# Patient Record
Sex: MALE | Race: WHITE | Employment: FULL TIME | URBAN - NONMETROPOLITAN AREA
[De-identification: names, ages, dates, MRNs, and addresses within clinical notes are randomized per-mention and may not be internally consistent; named-entity substitution may affect disease eponyms.]

---
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
## 2019-04-02 ENCOUNTER — HOSPITAL ENCOUNTER (EMERGENCY)
Age: 19
Discharge: HOME | End: 2019-04-02
Payer: COMMERCIAL

## 2019-04-02 VITALS
WEIGHT: 125 LBS | RESPIRATION RATE: 16 BRPM | HEIGHT: 66 IN | HEART RATE: 98 BPM | DIASTOLIC BLOOD PRESSURE: 76 MMHG | SYSTOLIC BLOOD PRESSURE: 102 MMHG | TEMPERATURE: 98.2 F | BODY MASS INDEX: 20.09 KG/M2 | OXYGEN SATURATION: 98 %

## 2019-04-02 DIAGNOSIS — J02.0 STREP PHARYNGITIS: Primary | ICD-10-CM

## 2019-04-02 LAB
FLU A ANTIGEN: NEGATIVE
FLU B ANTIGEN: NEGATIVE
GROUP A STREP CULTURE, REFLEX: POSITIVE
HETEROPHILE ANTIBODIES: NEGATIVE
REFLEX THROAT C + S: NORMAL

## 2019-04-02 PROCEDURE — 36415 COLL VENOUS BLD VENIPUNCTURE: CPT

## 2019-04-02 PROCEDURE — 87880 STREP A ASSAY W/OPTIC: CPT

## 2019-04-02 PROCEDURE — 87804 INFLUENZA ASSAY W/OPTIC: CPT

## 2019-04-02 PROCEDURE — 86308 HETEROPHILE ANTIBODY SCREEN: CPT

## 2019-04-02 PROCEDURE — 6370000000 HC RX 637 (ALT 250 FOR IP): Performed by: STUDENT IN AN ORGANIZED HEALTH CARE EDUCATION/TRAINING PROGRAM

## 2019-04-02 PROCEDURE — 6360000002 HC RX W HCPCS: Performed by: STUDENT IN AN ORGANIZED HEALTH CARE EDUCATION/TRAINING PROGRAM

## 2019-04-02 PROCEDURE — 99282 EMERGENCY DEPT VISIT SF MDM: CPT

## 2019-04-02 PROCEDURE — 96372 THER/PROPH/DIAG INJ SC/IM: CPT

## 2019-04-02 RX ORDER — IBUPROFEN 800 MG/1
800 TABLET ORAL ONCE
Status: COMPLETED | OUTPATIENT
Start: 2019-04-02 | End: 2019-04-02

## 2019-04-02 RX ADMIN — PENICILLIN G BENZATHINE 0.6 MILLION UNITS: 1200000 INJECTION, SUSPENSION INTRAMUSCULAR at 12:08

## 2019-04-02 RX ADMIN — IBUPROFEN 800 MG: 800 TABLET, FILM COATED ORAL at 12:02

## 2019-04-02 ASSESSMENT — ENCOUNTER SYMPTOMS
SHORTNESS OF BREATH: 0
WHEEZING: 0
RHINORRHEA: 0
SORE THROAT: 1
VOMITING: 0
CHEST TIGHTNESS: 0
CHOKING: 0
ABDOMINAL PAIN: 0
NAUSEA: 0
COUGH: 1
TROUBLE SWALLOWING: 0
SINUS PRESSURE: 0

## 2019-04-02 ASSESSMENT — PAIN DESCRIPTION - PAIN TYPE: TYPE: ACUTE PAIN

## 2019-04-02 ASSESSMENT — PAIN SCALES - GENERAL: PAINLEVEL_OUTOF10: 5

## 2019-04-02 ASSESSMENT — PAIN DESCRIPTION - LOCATION: LOCATION: THROAT

## 2019-04-02 ASSESSMENT — PAIN DESCRIPTION - DESCRIPTORS: DESCRIPTORS: SORE

## 2019-04-02 NOTE — ED NOTES
Pt presents to the ED through triage with complaints of pharyngitis and cough. Pt states that these symptoms started about 2 or 3 days ago. States throat pain 5/10, and \"sore. \"  Pt states that he has been coughing and \"hacking up mucus. \"  Flu and strep swabs obtained and sent to lab at this time.      Tadeo Feliciano, LINDSAY  04/02/19 4707

## 2019-04-02 NOTE — LETTER
[facility] [address] [address] EMERGENCY DEPT  [address]  [address] [place] [address] [address] [address]  Phone: [phone]               April 2, 2019    Patient: Aylin Hollis   YOB: 2000   Date of Visit: 4/2/2019       To Whom It May Concern:    Xiomara Tinajero was seen and treated in our emergency department on 4/2/2019. He may return to work on 4/3/2019.       Sincerely,       Tai handley RN         Signature:__________________________________

## 2019-04-02 NOTE — ED PROVIDER NOTES
MONONUCLEOSIS SCREEN       EMERGENCYDEPARTMENTCOURSE:   Vitals:    Vitals:    04/02/19 1115   BP: 102/76   Pulse: 98   Resp: 16   Temp: 98.2 °F (36.8 °C)   TempSrc: Oral   SpO2: 98%   Weight: 125 lb (56.7 kg)   Height: 5' 6\" (1.676 m)     Patient was seen history physical exam was performed. See disposition below    MDM:  Patient's strep test is negative. Flu and mono negative. Treated in ED with bicillin and motrin with symptom improvement. Anticipatory guidance given and patient is comfortable with plan of care. They will follow up with PCP for follow up or further evaluation if symptoms continue. They will return to the ED with worsening of symptoms and we discussed reasons for returning. CRITICAL CARE:   None    CONSULTS:  None    PROCEDURES:  None    FINAL IMPRESSION      1. Strep pharyngitis          DISPOSITION/PLAN   discharge    PATIENT REFERREDTO:  [address] [place] [address]  Go to   If symptoms worsen      DISCHARGE MEDICATIONS:  There are no discharge medications for this patient. (Please note that portions of this note were completed with a voice recognition program.  Efforts were made to edit the dictations but occasionally words are mis-transcribed.)    The patient was given an opportunity to see the Emergency Attending. The patient voiced understanding that I was a Mid-Level Provider and was in agreement with being seen independently by myself. Provider:  I personally performed the services described in the documentation, reviewed and edited the documentation which was dictated to the scribe in my presence, and it accurately records my words and actions.     Nalini Nunn PA-C 4/2/19 12:51 PM    ANGEL Urbina PA-C  04/02/19 [id number]

## 2019-08-02 ENCOUNTER — HOSPITAL ENCOUNTER (EMERGENCY)
Age: 19
Discharge: HOME | End: 2019-08-02
Attending: EMERGENCY MEDICINE
Payer: MEDICAID

## 2019-08-02 ENCOUNTER — APPOINTMENT (OUTPATIENT)
Dept: GENERAL RADIOLOGY | Age: 19
End: 2019-08-02
Payer: MEDICAID

## 2019-08-02 VITALS
DIASTOLIC BLOOD PRESSURE: 74 MMHG | SYSTOLIC BLOOD PRESSURE: 122 MMHG | RESPIRATION RATE: 18 BRPM | HEIGHT: 67 IN | TEMPERATURE: 98.4 F | HEART RATE: 88 BPM | OXYGEN SATURATION: 99 % | WEIGHT: 125 LBS | BODY MASS INDEX: 19.62 KG/M2

## 2019-08-02 DIAGNOSIS — J06.9 ACUTE UPPER RESPIRATORY INFECTION: Primary | ICD-10-CM

## 2019-08-02 LAB
FLU A ANTIGEN: NEGATIVE
FLU B ANTIGEN: NEGATIVE
GROUP A STREP CULTURE, REFLEX: NEGATIVE
REFLEX THROAT C + S: NORMAL

## 2019-08-02 PROCEDURE — 87880 STREP A ASSAY W/OPTIC: CPT

## 2019-08-02 PROCEDURE — 87804 INFLUENZA ASSAY W/OPTIC: CPT

## 2019-08-02 PROCEDURE — 99283 EMERGENCY DEPT VISIT LOW MDM: CPT

## 2019-08-02 PROCEDURE — 71046 X-RAY EXAM CHEST 2 VIEWS: CPT

## 2019-08-02 PROCEDURE — 6370000000 HC RX 637 (ALT 250 FOR IP): Performed by: EMERGENCY MEDICINE

## 2019-08-02 PROCEDURE — 87070 CULTURE OTHR SPECIMN AEROBIC: CPT

## 2019-08-02 RX ORDER — BENZONATATE 100 MG/1
100 CAPSULE ORAL 3 TIMES DAILY PRN
Qty: 20 CAPSULE | Refills: 0 | Status: SHIPPED | OUTPATIENT
Start: 2019-08-02 | End: 2019-08-09

## 2019-08-02 RX ORDER — IBUPROFEN 200 MG
400 TABLET ORAL ONCE
Status: COMPLETED | OUTPATIENT
Start: 2019-08-02 | End: 2019-08-02

## 2019-08-02 RX ORDER — ONDANSETRON 4 MG/1
4 TABLET, ORALLY DISINTEGRATING ORAL ONCE
Status: COMPLETED | OUTPATIENT
Start: 2019-08-02 | End: 2019-08-02

## 2019-08-02 RX ORDER — NAPROXEN 250 MG/1
250 TABLET ORAL 2 TIMES DAILY WITH MEALS
Qty: 30 TABLET | Refills: 0 | Status: ON HOLD | OUTPATIENT
Start: 2019-08-02 | End: 2022-05-13 | Stop reason: HOSPADM

## 2019-08-02 RX ORDER — BENZONATATE 100 MG/1
100 CAPSULE ORAL 3 TIMES DAILY PRN
Status: DISCONTINUED | OUTPATIENT
Start: 2019-08-02 | End: 2019-08-02 | Stop reason: HOSPADM

## 2019-08-02 RX ORDER — ALBUTEROL SULFATE 90 UG/1
2 AEROSOL, METERED RESPIRATORY (INHALATION) EVERY 4 HOURS PRN
Qty: 1 INHALER | Refills: 0 | Status: SHIPPED | OUTPATIENT
Start: 2019-08-02 | End: 2019-08-09

## 2019-08-02 RX ADMIN — ONDANSETRON 4 MG: 4 TABLET, ORALLY DISINTEGRATING ORAL at 10:43

## 2019-08-02 RX ADMIN — IBUPROFEN 400 MG: 200 TABLET, FILM COATED ORAL at 10:43

## 2019-08-02 RX ADMIN — BENZONATATE 100 MG: 100 CAPSULE ORAL at 10:42

## 2019-08-02 ASSESSMENT — ENCOUNTER SYMPTOMS
WHEEZING: 0
NAUSEA: 0
COUGH: 1
EYE DISCHARGE: 0
VOMITING: 1
TROUBLE SWALLOWING: 0
CONSTIPATION: 0
RHINORRHEA: 1
SORE THROAT: 1
DIARRHEA: 0
EYE REDNESS: 0
ABDOMINAL PAIN: 0
SINUS PRESSURE: 0
CHEST TIGHTNESS: 0
SHORTNESS OF BREATH: 0

## 2019-08-02 ASSESSMENT — PAIN DESCRIPTION - DESCRIPTORS: DESCRIPTORS: BURNING

## 2019-08-02 ASSESSMENT — PAIN DESCRIPTION - LOCATION: LOCATION: THROAT

## 2019-08-02 ASSESSMENT — PAIN SCALES - GENERAL
PAINLEVEL_OUTOF10: 5
PAINLEVEL_OUTOF10: 5

## 2019-08-02 ASSESSMENT — PAIN DESCRIPTION - PAIN TYPE: TYPE: ACUTE PAIN

## 2019-08-02 NOTE — ED PROVIDER NOTES
has a past medical history of ADHD (attention deficit hyperactivity disorder). SURGICAL HISTORY      has no past surgical history on file. CURRENT MEDICATIONS       Discharge Medication List as of 8/2/2019 12:21 PM          ALLERGIES     has No Known Allergies. FAMILY HISTORY     He indicated that his mother is alive. He indicated that his father is alive. family history is not on file. SOCIAL HISTORY      reports that he has been smoking. He has been smoking about 0.50 packs per day for the past 0.00 years. He has never used smokeless tobacco. He reports that he drinks alcohol. He reports that he has current or past drug history. Drug: Marijuana. PHYSICAL EXAM     INITIAL VITALS:  height is 5' 7\" (1.702 m) and weight is 125 lb (56.7 kg). His oral temperature is 98.4 °F (36.9 °C). His blood pressure is 122/74 and his pulse is 88. His respiration is 18 and oxygen saturation is 99%. CONSTITUTIONAL: [Awake, alert, non toxic, well developed, well nourished, no acute distress]  HEAD: [Normocephalic, atraumatic]  EYES: [Pupils equal, round & reactive to light, extraocular movements intact, no nystagmus, clear conjunctiva, non-icteric sclera]  ENT: [External ear canal clear without evidence of cerumen impaction or foreign body, TM's clear without erythema or bulging. Nares patent without drainage, septum appears midline. Moist mucus membranes, oropharynx is diffusely erythematous without exudate or mass. Uvula midline]  NECK: [Nontender and supple. No meningismus, no appreciated lymphadenopathy. Intact full range of motion. C-spine midline without vertebral tenderness. Trachea midline.]  CARDIOVASCULAR: [Regular rate, rhythm, normal S1 and S2. No appreciated murmurs, rubs, or gallops. No pulse deficits appreciated. Intact distal perfusion. JVD not appreciated.]  PULMONARY: [Respiratory distress absent. Respiratory effort normal. Breath sounds clear to auscultation without rhonchi, rales, or wheezing. taking these medications    Details   benzonatate (TESSALON) 100 MG capsule Take 1 capsule by mouth 3 times daily as needed for Cough, Disp-20 capsule, R-0Print      albuterol sulfate  (90 Base) MCG/ACT inhaler Inhale 2 puffs into the lungs every 4 hours as needed for Wheezing, Disp-1 Inhaler, R-0Print      naproxen (NAPROSYN) 250 MG tablet Take 1 tablet by mouth 2 times daily (with meals), Disp-30 tablet, R-0Print             (Please note that portions ofthis note were completed with a voice recognition program.  Efforts were made to edit the dictations but occasionally words are mis-transcribed.)    Scribe:  Lilly Bradley 8/2/19 10:17 AM Scribing for and in the presence of Terris Cooks, MD.    Signed by: Liat Kidd, 08/02/19 12:47 PM    Provider:  I personally performed the services described in the documentation, reviewed and edited the documentation which was dictated to the scribe in my presence, and it accurately records my words and actions.     Terris Cooks, MD 8/2/19 12:47 PM                  Terris Cooks, MD  08/02/19 1376

## 2019-08-02 NOTE — ED NOTES
Patient to ED for cough. Patient state cough started two days ago. Patient now coughing so hard it makes him vomit. Patient denies fever diarrhea.      Herminia Stoll RN  08/02/19 1007

## 2019-08-04 LAB — THROAT/NOSE CULTURE: NORMAL

## 2021-06-15 ENCOUNTER — HOSPITAL ENCOUNTER (EMERGENCY)
Age: 21
Discharge: HOME | End: 2021-06-15
Payer: COMMERCIAL

## 2021-06-15 ENCOUNTER — APPOINTMENT (OUTPATIENT)
Dept: CT IMAGING | Age: 21
End: 2021-06-15
Payer: COMMERCIAL

## 2021-06-15 VITALS
HEART RATE: 71 BPM | SYSTOLIC BLOOD PRESSURE: 155 MMHG | RESPIRATION RATE: 15 BRPM | DIASTOLIC BLOOD PRESSURE: 77 MMHG | TEMPERATURE: 98.4 F | WEIGHT: 135 LBS | OXYGEN SATURATION: 99 % | HEIGHT: 67 IN | BODY MASS INDEX: 21.19 KG/M2

## 2021-06-15 DIAGNOSIS — F07.81 POST CONCUSSION SYNDROME: Primary | ICD-10-CM

## 2021-06-15 PROCEDURE — 99282 EMERGENCY DEPT VISIT SF MDM: CPT

## 2021-06-15 PROCEDURE — 6370000000 HC RX 637 (ALT 250 FOR IP): Performed by: PHYSICIAN ASSISTANT

## 2021-06-15 PROCEDURE — 70450 CT HEAD/BRAIN W/O DYE: CPT

## 2021-06-15 PROCEDURE — 72125 CT NECK SPINE W/O DYE: CPT

## 2021-06-15 RX ORDER — NAPROXEN 250 MG/1
500 TABLET ORAL ONCE
Status: COMPLETED | OUTPATIENT
Start: 2021-06-15 | End: 2021-06-15

## 2021-06-15 RX ADMIN — NAPROXEN 500 MG: 250 TABLET ORAL at 13:48

## 2021-06-15 ASSESSMENT — PAIN DESCRIPTION - LOCATION: LOCATION: HEAD

## 2021-06-15 ASSESSMENT — PAIN DESCRIPTION - PAIN TYPE: TYPE: ACUTE PAIN

## 2021-06-15 ASSESSMENT — PAIN SCALES - GENERAL: PAINLEVEL_OUTOF10: 8
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
## 2021-10-06 ENCOUNTER — HOSPITAL ENCOUNTER (EMERGENCY)
Age: 21
Discharge: HOME | End: 2021-10-06
Attending: EMERGENCY MEDICINE
Payer: COMMERCIAL

## 2021-10-06 VITALS
HEIGHT: 67 IN | BODY MASS INDEX: 21.03 KG/M2 | DIASTOLIC BLOOD PRESSURE: 79 MMHG | OXYGEN SATURATION: 97 % | RESPIRATION RATE: 18 BRPM | HEART RATE: 63 BPM | TEMPERATURE: 98.4 F | WEIGHT: 134 LBS | SYSTOLIC BLOOD PRESSURE: 111 MMHG

## 2021-10-06 DIAGNOSIS — K29.00 ACUTE GASTRITIS WITHOUT HEMORRHAGE, UNSPECIFIED GASTRITIS TYPE: ICD-10-CM

## 2021-10-06 DIAGNOSIS — R10.13 ABDOMINAL PAIN, EPIGASTRIC: Primary | ICD-10-CM

## 2021-10-06 LAB
ALBUMIN SERPL-MCNC: 4.9 G/DL (ref 3.5–5.1)
ALP BLD-CCNC: 56 U/L (ref 38–126)
ALT SERPL-CCNC: 13 U/L (ref 11–66)
ANION GAP SERPL CALCULATED.3IONS-SCNC: 11 MEQ/L (ref 8–16)
AST SERPL-CCNC: 23 U/L (ref 5–40)
BASOPHILS # BLD: 0.7 %
BASOPHILS ABSOLUTE: 0 THOU/MM3 (ref 0–0.1)
BILIRUB SERPL-MCNC: 0.2 MG/DL (ref 0.3–1.2)
BUN BLDV-MCNC: 11 MG/DL (ref 7–22)
CALCIUM SERPL-MCNC: 9.6 MG/DL (ref 8.5–10.5)
CHLORIDE BLD-SCNC: 103 MEQ/L (ref 98–111)
CO2: 26 MEQ/L (ref 23–33)
CREAT SERPL-MCNC: 0.6 MG/DL (ref 0.4–1.2)
EOSINOPHIL # BLD: 2.7 %
EOSINOPHILS ABSOLUTE: 0.2 THOU/MM3 (ref 0–0.4)
ERYTHROCYTE [DISTWIDTH] IN BLOOD BY AUTOMATED COUNT: 13.2 % (ref 11.5–14.5)
ERYTHROCYTE [DISTWIDTH] IN BLOOD BY AUTOMATED COUNT: 45.5 FL (ref 35–45)
GFR SERPL CREATININE-BSD FRML MDRD: > 90 ML/MIN/1.73M2
GLUCOSE BLD-MCNC: 98 MG/DL (ref 70–108)
HCT VFR BLD CALC: 40.2 % (ref 42–52)
HEMOGLOBIN: 13.8 GM/DL (ref 14–18)
IMMATURE GRANS (ABS): 0.01 THOU/MM3 (ref 0–0.07)
IMMATURE GRANULOCYTES: 0.1 %
LIPASE: 27.8 U/L (ref 5.6–51.3)
LYMPHOCYTES # BLD: 36.3 %
LYMPHOCYTES ABSOLUTE: 2.5 THOU/MM3 (ref 1–4.8)
MCH RBC QN AUTO: 32.3 PG (ref 26–33)
MCHC RBC AUTO-ENTMCNC: 34.3 GM/DL (ref 32.2–35.5)
MCV RBC AUTO: 94.1 FL (ref 80–94)
MONOCYTES # BLD: 8.3 %
MONOCYTES ABSOLUTE: 0.6 THOU/MM3 (ref 0.4–1.3)
NUCLEATED RED BLOOD CELLS: 0 /100 WBC
OSMOLALITY CALCULATION: 278.8 MOSMOL/KG (ref 275–300)
PLATELET # BLD: 229 THOU/MM3 (ref 130–400)
PMV BLD AUTO: 9.9 FL (ref 9.4–12.4)
POTASSIUM REFLEX MAGNESIUM: 3.9 MEQ/L (ref 3.5–5.2)
RBC # BLD: 4.27 MILL/MM3 (ref 4.7–6.1)
SEG NEUTROPHILS: 51.9 %
SEGMENTED NEUTROPHILS ABSOLUTE COUNT: 3.6 THOU/MM3 (ref 1.8–7.7)
SODIUM BLD-SCNC: 140 MEQ/L (ref 135–145)
TOTAL PROTEIN: 6.9 G/DL (ref 6.1–8)
WBC # BLD: 7 THOU/MM3 (ref 4.8–10.8)

## 2021-10-06 PROCEDURE — 85025 COMPLETE CBC W/AUTO DIFF WBC: CPT

## 2021-10-06 PROCEDURE — 80053 COMPREHEN METABOLIC PANEL: CPT

## 2021-10-06 PROCEDURE — 99284 EMERGENCY DEPT VISIT MOD MDM: CPT

## 2021-10-06 PROCEDURE — 36415 COLL VENOUS BLD VENIPUNCTURE: CPT

## 2021-10-06 PROCEDURE — 83690 ASSAY OF LIPASE: CPT

## 2021-10-06 PROCEDURE — 6370000000 HC RX 637 (ALT 250 FOR IP): Performed by: EMERGENCY MEDICINE

## 2021-10-06 RX ORDER — SUCRALFATE 1 G/1
1 TABLET ORAL 4 TIMES DAILY
Qty: 40 TABLET | Refills: 0 | Status: ON HOLD | OUTPATIENT
Start: 2021-10-06 | End: 2022-05-13 | Stop reason: HOSPADM

## 2021-10-06 RX ORDER — PANTOPRAZOLE SODIUM 40 MG/1
40 TABLET, DELAYED RELEASE ORAL ONCE
Status: COMPLETED | OUTPATIENT
Start: 2021-10-06 | End: 2021-10-06

## 2021-10-06 RX ORDER — PANTOPRAZOLE SODIUM 40 MG/1
40 TABLET, DELAYED RELEASE ORAL DAILY
Qty: 90 TABLET | Refills: 0 | Status: ON HOLD | OUTPATIENT
Start: 2021-10-06 | End: 2022-05-13 | Stop reason: HOSPADM

## 2021-10-06 RX ORDER — MAGNESIUM HYDROXIDE/ALUMINUM HYDROXICE/SIMETHICONE 120; 1200; 1200 MG/30ML; MG/30ML; MG/30ML
30 SUSPENSION ORAL ONCE
Status: COMPLETED | OUTPATIENT
Start: 2021-10-06 | End: 2021-10-06

## 2021-10-06 RX ADMIN — ALUMINUM HYDROXIDE, MAGNESIUM HYDROXIDE, AND SIMETHICONE 30 ML: 200; 200; 20 SUSPENSION ORAL at 14:01

## 2021-10-06 RX ADMIN — PANTOPRAZOLE SODIUM 40 MG: 40 TABLET, DELAYED RELEASE ORAL at 14:01

## 2021-10-06 ASSESSMENT — ENCOUNTER SYMPTOMS
SHORTNESS OF BREATH: 0
RHINORRHEA: 0
TROUBLE SWALLOWING: 0
WHEEZING: 0
COUGH: 0
VOMITING: 0
SINUS PRESSURE: 0
SORE THROAT: 0
NAUSEA: 1
DIARRHEA: 0
VOICE CHANGE: 0
ABDOMINAL PAIN: 1
CONSTIPATION: 0
BACK PAIN: 0
CHEST TIGHTNESS: 0

## 2021-10-06 ASSESSMENT — PAIN DESCRIPTION - ORIENTATION: ORIENTATION: MID;UPPER

## 2021-10-06 ASSESSMENT — PAIN SCALES - GENERAL: PAINLEVEL_OUTOF10: 6

## 2021-10-06 ASSESSMENT — PAIN DESCRIPTION - PAIN TYPE: TYPE: ACUTE PAIN

## 2021-10-06 ASSESSMENT — PAIN DESCRIPTION - LOCATION: LOCATION: ABDOMEN

## 2021-10-06 NOTE — ED PROVIDER NOTES
[facility]        Room # 32/12A    CHIEF COMPLAINT    Chief Complaint   Patient presents with    Abdominal Pain       Nurses Notes reviewed and I agree except as noted in the HPI. HPI    Linda Powell is a 24 y.o. male who presents for evaluation of pain in the epigastric area upon waking up 7:30 this morning. The patient's pain is nonradiating is worse when the patient went to work. The pain characterized as sharp burning pain. Patient did not have any cough, no cold, no shortness of breath, no chest pain, no nausea, no vomiting. Denies any alcohol intake yesterday or today however they ate pizza  and spicy chicken last night. Did not have any melena or hematochezia. REVIEW OF SYSTEMS    Review of Systems   Constitutional: Negative for appetite change, chills, diaphoresis, fatigue and fever. HENT: Negative for congestion, ear discharge, ear pain, postnasal drip, rhinorrhea, sinus pressure, sore throat, trouble swallowing and voice change. Respiratory: Negative for cough, chest tightness, shortness of breath and wheezing. Cardiovascular: Negative for chest pain, palpitations and leg swelling. Gastrointestinal: Positive for abdominal pain and nausea. Negative for constipation, diarrhea and vomiting. Musculoskeletal: Negative for arthralgias, back pain, joint swelling, myalgias, neck pain and neck stiffness. Skin: Negative for rash. Neurological: Negative for dizziness, syncope, weakness, light-headedness, numbness and headaches. PAST MEDICAL HISTORY     has a past medical history of ADHD (attention deficit hyperactivity disorder). SURGICAL HISTORY   has no past surgical history on file.     CURRENT MEDICATIONS    Previous Medications    ALBUTEROL SULFATE  (90 BASE) MCG/ACT INHALER    Inhale 2 puffs into the lungs every 4 hours as needed for Wheezing    NAPROXEN (NAPROSYN) 250 MG TABLET motion and neck supple. Lymphadenopathy:      Cervical: No cervical adenopathy. Skin:     Findings: No rash. Neurological:      Mental Status: He is alert and oriented to person, place, and time. Psychiatric:         Behavior: Behavior is cooperative. MEDICAL DECISION MAKING    DIFFERENTIAL DIAGNOSIS:  Arthritis, GERD, peptic ulcer disease pancreatitis      DIAGNOSTIC RESULTS      RADIOLOGY:    No orders to display       LABS:   Labs Reviewed   CBC WITH AUTO DIFFERENTIAL - Abnormal; Notable for the following components:       Result Value    RBC 4.27 (*)     Hemoglobin 13.8 (*)     Hematocrit 40.2 (*)     MCV 94.1 (*)     RDW-SD 45.5 (*)     All other components within normal limits   COMPREHENSIVE METABOLIC PANEL W/ REFLEX TO MG FOR LOW K - Abnormal; Notable for the following components: Total Bilirubin 0.2 (*)     All other components within normal limits   LIPASE   ANION GAP   GLOMERULAR FILTRATION RATE, ESTIMATED   OSMOLALITY     All other unresulted laboratory test above are normal:    Vitals:    Vitals:    10/06/21 1241 10/06/21 1428   BP: 135/84 111/79   Pulse: 60 63   Resp: 16 18   Temp: 98.4 °F (36.9 °C)    TempSrc: Oral    SpO2: 99% 97%   Weight: 134 lb (60.8 kg)    Height: 5' 7\" (1.702 m)        EMERGENCY DEPARTMENT COURSE:    Medications   aluminum & magnesium hydroxide-simethicone (MAALOX) 200-200-20 MG/5ML suspension 30 mL (30 mLs Oral Given 10/6/21 1401)   pantoprazole (PROTONIX) tablet 40 mg (40 mg Oral Given 10/6/21 1401)       The pt was seen and evaluated by me. Within the department, I observed the pt's vitalsigns to be within acceptable range. Laboratory studies were performed, results were reviewed with the patient. Within the department, the pt was treated with Maalox and Protonix. Patient when reevaluated pain had improved and feeling well. I observed the pt's condition to be hemodynamically stable during the duration of their stay.  I explained my proposed course of treatment to the pt, and they were amenable to my decision. They were discharged home, and they will return to the ED if their symptoms become more severein nature, or otherwise change. Controlled Substances Monitoring:        CRITICAL CARE:   None. CONSULTS:  None    PROCEDURES:  None. FINAL IMPRESSION       1. Abdominal pain, epigastric/    2. Acute gastritis without hemorrhage, unspecified gastritis type          DISPOSITION/PLAN  PATIENT REFERRED TO:  [facility] [address],[address] [facility] [address] [place] [address]  Schedule an appointment as soon as possible for a visit in 1 week      DISCHARGE MEDICATIONS:  New Prescriptions    PANTOPRAZOLE (PROTONIX) 40 MG TABLET    Take 1 tablet by mouth daily    SUCRALFATE (CARAFATE) 1 GM TABLET    Take 1 tablet by mouth 4 times daily for 40 doses         (Please note that portions of this note were completed with a voice recognition program and electronically transcribed. Efforts were [facility] edit the dictations but occasionally words are mis-transcribed . The transcription may contain errors not detected in proofreading.   This transcription was electronically signed.)     10/06/21 3:03 PM      Jose Guadalupe Langley MD      Emergency room physician           Jose Guadalupe Langley MD  10/09/21 2684

## 2022-01-09 ENCOUNTER — HOSPITAL ENCOUNTER (EMERGENCY)
Age: 22
Discharge: HOME | End: 2022-01-09
Attending: EMERGENCY MEDICINE
Payer: COMMERCIAL

## 2022-01-09 ENCOUNTER — APPOINTMENT (OUTPATIENT)
Dept: GENERAL RADIOLOGY | Age: 22
End: 2022-01-09
Payer: COMMERCIAL

## 2022-01-09 VITALS
HEIGHT: 67 IN | DIASTOLIC BLOOD PRESSURE: 78 MMHG | TEMPERATURE: 98.1 F | RESPIRATION RATE: 18 BRPM | HEART RATE: 98 BPM | BODY MASS INDEX: 21.19 KG/M2 | SYSTOLIC BLOOD PRESSURE: 122 MMHG | WEIGHT: 135 LBS | OXYGEN SATURATION: 98 %

## 2022-01-09 DIAGNOSIS — F41.1 ANXIETY STATE: ICD-10-CM

## 2022-01-09 DIAGNOSIS — R07.9 CHEST PAIN, UNSPECIFIED TYPE: Primary | ICD-10-CM

## 2022-01-09 LAB
ANION GAP SERPL CALCULATED.3IONS-SCNC: 13 MEQ/L (ref 8–16)
BASOPHILS # BLD: 0.5 %
BASOPHILS ABSOLUTE: 0.1 THOU/MM3 (ref 0–0.1)
BUN BLDV-MCNC: 13 MG/DL (ref 7–22)
CALCIUM SERPL-MCNC: 10.3 MG/DL (ref 8.5–10.5)
CHLORIDE BLD-SCNC: 100 MEQ/L (ref 98–111)
CO2: 24 MEQ/L (ref 23–33)
CREAT SERPL-MCNC: 0.8 MG/DL (ref 0.4–1.2)
EOSINOPHIL # BLD: 0.7 %
EOSINOPHILS ABSOLUTE: 0.1 THOU/MM3 (ref 0–0.4)
ERYTHROCYTE [DISTWIDTH] IN BLOOD BY AUTOMATED COUNT: 13.5 % (ref 11.5–14.5)
ERYTHROCYTE [DISTWIDTH] IN BLOOD BY AUTOMATED COUNT: 45.5 FL (ref 35–45)
GFR SERPL CREATININE-BSD FRML MDRD: > 90 ML/MIN/1.73M2
GLUCOSE BLD-MCNC: 91 MG/DL (ref 70–108)
HCT VFR BLD CALC: 43.9 % (ref 42–52)
HEMOGLOBIN: 15.3 GM/DL (ref 14–18)
IMMATURE GRANS (ABS): 0.03 THOU/MM3 (ref 0–0.07)
IMMATURE GRANULOCYTES: 0.2 %
LYMPHOCYTES # BLD: 15.6 %
LYMPHOCYTES ABSOLUTE: 2.1 THOU/MM3 (ref 1–4.8)
MAGNESIUM: 2 MG/DL (ref 1.6–2.4)
MCH RBC QN AUTO: 31.8 PG (ref 26–33)
MCHC RBC AUTO-ENTMCNC: 34.9 GM/DL (ref 32.2–35.5)
MCV RBC AUTO: 91.3 FL (ref 80–94)
MONOCYTES # BLD: 7.5 %
MONOCYTES ABSOLUTE: 1 THOU/MM3 (ref 0.4–1.3)
NUCLEATED RED BLOOD CELLS: 0 /100 WBC
OSMOLALITY CALCULATION: 273.5 MOSMOL/KG (ref 275–300)
PLATELET # BLD: 290 THOU/MM3 (ref 130–400)
PMV BLD AUTO: 9.2 FL (ref 9.4–12.4)
POTASSIUM REFLEX MAGNESIUM: 3.4 MEQ/L (ref 3.5–5.2)
RBC # BLD: 4.81 MILL/MM3 (ref 4.7–6.1)
SEG NEUTROPHILS: 75.5 %
SEGMENTED NEUTROPHILS ABSOLUTE COUNT: 10 THOU/MM3 (ref 1.8–7.7)
SODIUM BLD-SCNC: 137 MEQ/L (ref 135–145)
TROPONIN T: < 0.01 NG/ML
WBC # BLD: 13.3 THOU/MM3 (ref 4.8–10.8)

## 2022-01-09 PROCEDURE — 36415 COLL VENOUS BLD VENIPUNCTURE: CPT

## 2022-01-09 PROCEDURE — 6360000002 HC RX W HCPCS: Performed by: EMERGENCY MEDICINE

## 2022-01-09 PROCEDURE — 71045 X-RAY EXAM CHEST 1 VIEW: CPT

## 2022-01-09 PROCEDURE — 85025 COMPLETE CBC W/AUTO DIFF WBC: CPT

## 2022-01-09 PROCEDURE — 80048 BASIC METABOLIC PNL TOTAL CA: CPT

## 2022-01-09 PROCEDURE — 93005 ELECTROCARDIOGRAM TRACING: CPT | Performed by: EMERGENCY MEDICINE

## 2022-01-09 PROCEDURE — 84484 ASSAY OF TROPONIN QUANT: CPT

## 2022-01-09 PROCEDURE — 99284 EMERGENCY DEPT VISIT MOD MDM: CPT

## 2022-01-09 PROCEDURE — 96376 TX/PRO/DX INJ SAME DRUG ADON: CPT

## 2022-01-09 PROCEDURE — 96374 THER/PROPH/DIAG INJ IV PUSH: CPT

## 2022-01-09 PROCEDURE — 83735 ASSAY OF MAGNESIUM: CPT

## 2022-01-09 RX ORDER — LORAZEPAM 2 MG/ML
1 INJECTION INTRAMUSCULAR ONCE
Status: COMPLETED | OUTPATIENT
Start: 2022-01-09 | End: 2022-01-09

## 2022-01-09 RX ADMIN — LORAZEPAM 1 MG: 2 INJECTION INTRAMUSCULAR; INTRAVENOUS at 10:26

## 2022-01-09 RX ADMIN — LORAZEPAM 1 MG: 2 INJECTION INTRAMUSCULAR; INTRAVENOUS at 11:51

## 2022-01-09 ASSESSMENT — ENCOUNTER SYMPTOMS
ABDOMINAL PAIN: 0
BLOOD IN STOOL: 0
SHORTNESS OF BREATH: 1
CHEST TIGHTNESS: 1
VOMITING: 0
COUGH: 0
BACK PAIN: 0
NAUSEA: 0
DIARRHEA: 0
TROUBLE SWALLOWING: 0
VOICE CHANGE: 0

## 2022-01-09 ASSESSMENT — PAIN DESCRIPTION - DESCRIPTORS: DESCRIPTORS: TIGHTNESS

## 2022-01-09 ASSESSMENT — PAIN DESCRIPTION - LOCATION: LOCATION: CHEST

## 2022-01-09 ASSESSMENT — PAIN DESCRIPTION - FREQUENCY: FREQUENCY: CONTINUOUS

## 2022-01-09 ASSESSMENT — PAIN DESCRIPTION - PAIN TYPE: TYPE: ACUTE PAIN

## 2022-01-09 ASSESSMENT — PAIN SCALES - GENERAL: PAINLEVEL_OUTOF10: 6

## 2022-01-09 NOTE — ED PROVIDER NOTES
[facility] [address] [address] EMERGENCY DEPT    EMERGENCY MEDICINE     Pt Name: Mamta Verdugo  MRN: [id number]  [place] 2000  Date of evaluation: 1/9/2022  Provider: Sd Petersen DO, [place] COMPLAINT       Chief Complaint   Patient presents with    Anxiety       HISTORY OF PRESENT ILLNESS    Mamta Verdugo is a pleasant 24 y.o. male   Presents to the emergency department from home   Chest tightness and SOB; symptoms started this mornign when he awoke  No diaphoresis  +carpopedal paresthesias and spasms  Snorted cocaine around 24hrs ago but denies any other drugs  No syncope, no diaphoresis      Triage notes and Nursing notes were reviewed by myself. Any discrepancies are addressed above. PAST MEDICAL HISTORY     Past Medical History:   Diagnosis Date    ADHD (attention deficit hyperactivity disorder)        SURGICAL HISTORY     History reviewed. No pertinent surgical history. CURRENT MEDICATIONS       Discharge Medication List as of 1/9/2022  1:08 PM      CONTINUE these medications which have NOT CHANGED    Details   pantoprazole (PROTONIX) 40 MG tablet Take 1 tablet by mouth daily, Disp-90 tablet, R-0Normal      sucralfate (CARAFATE) 1 GM tablet Take 1 tablet by mouth 4 times daily for 40 doses, Disp-40 tablet, R-0Normal      albuterol sulfate  (90 Base) MCG/ACT inhaler Inhale 2 puffs into the lungs every 4 hours as needed for Wheezing, Disp-1 Inhaler, R-0Print      naproxen (NAPROSYN) 250 MG tablet Take 1 tablet by mouth 2 times daily (with meals), Disp-30 tablet, R-0Print             ALLERGIES     Patient has no known allergies. FAMILY HISTORY     History reviewed. No pertinent family history.      SOCIAL HISTORY       Social History     Socioeconomic History    Marital status: Single     Spouse name: None    Number of children: None    Years of education: None    Highest education level: None   Occupational History    None   Tobacco Use    Smoking status: Current Every Day Smoker Packs/day: 0.50     Years: 0.00     Pack years: 0.00    Smokeless tobacco: Never Used   Vaping Use    Vaping Use: Never used   Substance and Sexual Activity    Alcohol use: Yes     Comment: rare    Drug use: Yes     Types: Marijuana Montrell Don)    Sexual activity: None   Other Topics Concern    None   Social History Narrative    None     Social Determinants of Health     Financial Resource Strain:     Difficulty of Paying Living Expenses: Not on file   Food Insecurity:     Worried About Running Out of Food in the Last Year: Not on file    Shae of Food in the Last Year: Not on file   Transportation Needs:     Lack of Transportation (Medical): Not on file    Lack of Transportation (Non-Medical): Not on file   Physical Activity:     Days of Exercise per Week: Not on file    Minutes of Exercise per Session: Not on file   Stress:     Feeling of Stress : Not on file   Social Connections:     Frequency of Communication with Friends and Family: Not on file    Frequency of Social Gatherings with Friends and Family: Not on file    Attends Scientology Services: Not on file    Active Member of Clubs or Organizations: Not on file    Attends Club or Organization Meetings: Not on file    Marital Status: Not on file   Intimate Partner Violence:     Fear of Current or Ex-Partner: Not on file    Emotionally Abused: Not on file    Physically Abused: Not on file    Sexually Abused: Not on file   Housing Stability:     Unable to Pay for Housing in the Last Year: Not on file    Number of [place] in the Last Year: Not on file    Unstable Housing in the Last Year: Not on file       REVIEW OF SYSTEMS     Review of Systems   Constitutional: Negative for chills, diaphoresis and fever. HENT: Negative for trouble swallowing and voice change. Eyes: Negative for visual disturbance. Respiratory: Positive for chest tightness and shortness of breath. Negative for cough.     Cardiovascular: Negative for chest pain and leg swelling. Gastrointestinal: Negative for abdominal pain, blood in stool, diarrhea, nausea and vomiting. Genitourinary: Negative for dysuria, frequency and hematuria. Musculoskeletal: Negative for back pain and neck pain. Skin: Negative for rash and wound. Neurological: Negative for speech difficulty, weakness, numbness and headaches. Psychiatric/Behavioral: Negative for confusion. Anxiety       Except as noted above the remainder of the review of systems was reviewed and is. PHYSICAL EXAM    (up to 7 for level 4, 8 or more for level 5)     ED Triage Vitals   BP Temp Temp src Pulse Resp SpO2 Height Weight   -- -- -- -- -- -- -- --       Physical Exam  Vitals and nursing note reviewed. Constitutional:       General: He is not in acute distress. Appearance: He is well-developed. He is not ill-appearing, toxic-appearing or diaphoretic. Comments: Appears anxious   HENT:      Head: Normocephalic and atraumatic. Eyes:      General: No scleral icterus. Conjunctiva/sclera: Conjunctivae normal.      Right eye: Right conjunctiva is not injected. Left eye: Left conjunctiva is not injected. Pupils: Pupils are equal, round, and reactive to light. Neck:      Thyroid: No thyromegaly. Trachea: No tracheal deviation. Cardiovascular:      Rate and Rhythm: Normal rate and regular rhythm. Heart sounds: Normal heart sounds. No murmur heard. No friction rub. No gallop. Pulmonary:      Effort: Pulmonary effort is normal. No respiratory distress. Breath sounds: Normal breath sounds. No stridor. No wheezing or rales. Abdominal:      General: Bowel sounds are normal. There is no distension. Palpations: Abdomen is soft. There is no mass. Tenderness: There is no abdominal tenderness. There is no guarding or rebound.       Comments: Negative Nassar's sign  Nontender McBurney's Point  Negative Rovsig's sign  No bruising or echymosis of abdomen Musculoskeletal:         General: No tenderness. Cervical back: Normal range of motion and neck supple. Comments: Negative Cyndy's Sign bilaterally   Lymphadenopathy:      Cervical: No cervical adenopathy. Skin:     General: Skin is warm and dry. Coloration: Skin is not pale. Findings: No erythema or rash. Neurological:      Mental Status: He is alert and oriented to person, place, and time. Cranial Nerves: No cranial nerve deficit. Motor: No abnormal muscle tone. Coordination: Coordination normal.      Comments: No nystagmus   Psychiatric:         Behavior: Behavior normal.         Thought Content: Thought content normal.         DIAGNOSTIC RESULTS     EKG:(none if blank)  All EKG's are interpreted by the[facility] Phys[facility] who either signs or Co-signs this chart in the absence of a cardiologist.        RADIOLOGY: (none if blank)   Interpretation per the Radiologistbelow, if available at the time of this note:    XR CHEST PORTABLE   Final Result   Stable radiographic appearance of the chest. No evidence of an acute process. **This report has been created using voice recognition software. It may contain minor errors which are inherent in voice recognition technology. **      Final report electronically signed by Dr. Ryan Rubio MD on 1/9/2022 11:17 AM          LABS:  Labs Reviewed   CBC WITH AUTO DIFFERENTIAL - Abnormal; Notable for the following components:       Result Value    WBC 13.3 (*)     RDW-SD 45.5 (*)     MPV 9.2 (*)     Segs Absolute 10.0 (*)     All other components within normal limits   BASIC METABOLIC PANEL W/ REFLEX TO MG FOR LOW K - Abnormal; Notable for the following components:    Potassium reflex Magnesium 3.4 (*)     All other components within normal limits   OSMOLALITY - Abnormal; Notable for the following components:    Osmolality Calc 273.5 (*)     All other components within normal limits   TROPONIN   ANION GAP GLOMERULAR FILTRATION RATE, ESTIMATED   MAGNESIUM       All other labs were within normal range or not returned as of this dictation. Please note, any cultures that may have been sent were not resulted at the time of this patient visit. EMERGENCY DEPARTMENT COURSE andMedical Decision Making:     MDM/  ED Course as of 01/19/22 1529   Sun Jan 09, 2022   1144 Reassessed, feels significantly better, however still continues to feel somewhat anxious. Carpopedal spasms have improved/resolved. Denies any recent illnesses  Has been under quite a bit of stress lately, including multiple bad family situations. The patient denies any homicidal or suicidal ideations. Does feel somewhat depressed but has no specific suicidality. Leukocytosis noted, attribute it well to patient's significant stress as well as stimulant use recently.   [AB]      ED Course User Index  [AB] Joseline Medeiros DO       Strict returnprecautions and follow up instructions were discussed with the patient with which the patient agrees      ED Medications administered this visit:    Medications   LORazepam (ATIVAN) injection 1 mg (1 mg IntraVENous Given 1/9/22 1026)   LORazepam (ATIVAN) injection 1 mg (1 mg IntraVENous Given 1/9/22 1151)         Procedures: (None if blank)      The care of the patient took place at a time of a significant regional and national Covid-19 surge, resulting in severe overcrowding and limitation of healthcare resources, including nursing staffing and inpatient beds. CLINICAL IMPRESSION     1. Chest pain, unspecified type    2. Anxiety state          DISPOSITION/PLAN   DISPOSITION Decision To Discharge 01/09/2022 12:16:20 PM      PATIENT REFERRED TO:  Dez Moore, [address] [place] [address] Dr CASAS [facility] [address]  [phone]    In 2 days      [address] [place] [address].  [address] [place] [address] [phone]  In 2 days        DISCHARGE MEDICATIONS:  Discharge Medication List as of 1/9/2022  1:08 PM (Please note that portions of this note were completed with a voice recognition program.  Efforts were made to edit the dictations but occasionallywords are mis-transcribed.)      Quita Scruggs DO, FACEP (electronically signed)  Attending Physician, Emergency [address], DO  01/19/22 1529

## 2022-01-09 NOTE — ED NOTES
Patient to ED via EMS from work for chest pain and anxiety. Patient states he was having a little chest pain and thought of his step dad who had a heart attack. This induced a anxiety attach. Patient complaining of numbness and tingling in bilateral upper extremities. Patient states hands are cramping.  patient alert and oriented on arrival. Patient admits to recent marijuana and cocaine use     Peter Go RN  01/09/22 4822

## 2022-01-09 NOTE — ED NOTES
Bed: 007A  Expected date:   Expected time:   Means of arrival:   Comments:  Tanesha Godinez, LINDSAY  01/09/22 1014

## 2022-01-10 LAB
EKG ATRIAL RATE: 92 BPM
EKG P AXIS: 77 DEGREES
EKG P-R INTERVAL: 106 MS
EKG Q-T INTERVAL: 330 MS
EKG QRS DURATION: 80 MS
EKG QTC CALCULATION (BAZETT): 408 MS
EKG R AXIS: 84 DEGREES
EKG T AXIS: 78 DEGREES
EKG VENTRICULAR RATE: 92 BPM

## 2022-01-10 PROCEDURE — 93010 ELECTROCARDIOGRAM REPORT: CPT | Performed by: INTERNAL MEDICINE

## 2022-02-01 ENCOUNTER — HOSPITAL ENCOUNTER (EMERGENCY)
Age: 22
Discharge: HOME | End: 2022-02-01
Payer: COMMERCIAL

## 2022-02-01 VITALS
OXYGEN SATURATION: 97 % | SYSTOLIC BLOOD PRESSURE: 111 MMHG | HEART RATE: 74 BPM | DIASTOLIC BLOOD PRESSURE: 76 MMHG | TEMPERATURE: 98.3 F | RESPIRATION RATE: 16 BRPM

## 2022-02-01 DIAGNOSIS — Z20.822 ENCOUNTER FOR LABORATORY TESTING FOR COVID-19 VIRUS: ICD-10-CM

## 2022-02-01 DIAGNOSIS — J02.9 VIRAL PHARYNGITIS: Primary | ICD-10-CM

## 2022-02-01 DIAGNOSIS — J06.9 VIRAL URI WITH COUGH: ICD-10-CM

## 2022-02-01 LAB
FLU A ANTIGEN: NEGATIVE
FLU B ANTIGEN: NEGATIVE
GROUP A STREP CULTURE, REFLEX: NEGATIVE
REFLEX THROAT C + S: NORMAL
SARS-COV-2, NAAT: NOT  DETECTED

## 2022-02-01 PROCEDURE — 87804 INFLUENZA ASSAY W/OPTIC: CPT

## 2022-02-01 PROCEDURE — 99282 EMERGENCY DEPT VISIT SF MDM: CPT

## 2022-02-01 PROCEDURE — 87070 CULTURE OTHR SPECIMN AEROBIC: CPT

## 2022-02-01 PROCEDURE — 87635 SARS-COV-2 COVID-19 AMP PRB: CPT

## 2022-02-01 PROCEDURE — 87880 STREP A ASSAY W/OPTIC: CPT

## 2022-02-01 ASSESSMENT — ENCOUNTER SYMPTOMS
RHINORRHEA: 0
SORE THROAT: 1
SINUS PRESSURE: 0
NAUSEA: 0
ABDOMINAL PAIN: 0
DIARRHEA: 0
VOMITING: 0
EYE DISCHARGE: 0
SHORTNESS OF BREATH: 1
COUGH: 1
EYE ITCHING: 0

## 2022-02-01 NOTE — ED PROVIDER NOTES
[facility] EMERGENCY DEPT      CHIEF COMPLAINT       Chief Complaint   Patient presents with    Covid Testing       Nurses Notes reviewed and I agree except as noted in the HPI. HISTORY OF PRESENT ILLNESS    Tyree Akins is a 24 y.o. male who presents for Covid testing. Patient has been sick for 4 days. He felt worse initially with chest pain shortness of breath sore throat, fever, myalgias, fatigue, cough, and mild nasal congestion. Most symptoms have improved or resolved except for the sore throat and fatigue. Patient was concern for COVID prompting his ER visit. He had no ride until today. Patient denies vomiting, diarrhea, change in appetite, current chest pain, current dyspnea, urinary abnormality, or other complaints. Patient is a smoker and has not been vaccinated against COVID. REVIEW OF SYSTEMS     Review of Systems   Constitutional: Positive for fatigue and fever. Negative for activity change, appetite change and chills. HENT: Positive for congestion and sore throat. Negative for ear pain, rhinorrhea and sinus pressure. Eyes: Negative for discharge and itching. Respiratory: Positive for cough and shortness of breath (Resolved). Cardiovascular: Positive for chest pain (Resolved). Gastrointestinal: Negative for abdominal pain, diarrhea, nausea and vomiting. Endocrine: Negative for polyuria. Genitourinary: Negative for decreased urine volume, dysuria and frequency. Musculoskeletal: Positive for myalgias. Negative for gait problem. Skin: Negative for rash. Neurological: Negative for weakness and light-headedness. Hematological: Negative for adenopathy. Psychiatric/Behavioral: Negative for confusion and sleep disturbance. PAST MEDICAL HISTORY    has a past medical history of ADHD (attention deficit hyperactivity disorder). SURGICAL HISTORY      has no past surgical history on file.     CURRENT MEDICATIONS       Previous Medications    ALBUTEROL SULFATE HFA sounds. No stridor. No decreased breath sounds or wheezing. Abdominal:      General: There is no distension. Palpations: Abdomen is soft. Abdomen is not rigid. Tenderness: There is no abdominal tenderness. There is no guarding. Musculoskeletal:         General: Normal range of motion. Cervical back: Normal range of motion and neck supple. No rigidity. Lymphadenopathy:      Cervical: No cervical adenopathy. Skin:     General: Skin is warm and dry. Coloration: Skin is not pale. Findings: No rash. Neurological:      Mental Status: He is alert and oriented to person, place, and time. GCS: GCS eye subscore is 4. GCS verbal subscore is 5. GCS motor subscore is 6. Gait: Gait normal.      Comments: No gross deficits observed   Psychiatric:         Mood and Affect: Mood normal.         Speech: Speech normal.         Behavior: Behavior normal.         Thought Content: Thought content normal.         DIFFERENTIAL DIAGNOSIS:   Including but not limited to: COVID, influenza, strep, sinusitis, common cold    DIAGNOSTIC RESULTS     EKG: All [facility]'s are [facility] who either signs or Co-signs this chart in the absence of a cardiologist.  None    RADIOLOGY: non-plain film images(s) such as CT,Ultrasound and MRI are read by the radiologist.  Plain radiographic images are visualized and preliminarily interpreted by the emergency physician unless otherwise stated below.   No orders to display       LABS:   Labs Reviewed   COVID-19, RAPID   RAPID INFLUENZA A/B ANTIGENS   CULTURE, THROAT    Narrative:     Source: Specimen not received       Site:           Current Antibiotics:   GROUP A STREP, REFLEX       EMERGENCY DEPARTMENT COURSE:   Vitals:    Vitals:    02/01/22 1259 02/01/22 1300   BP:  111/76   Pulse: 74    Resp: 16    Temp: 98.3 °F (36.8 °C)    TempSrc: Oral    SpO2: 97%        Patient was seen in the emergency department during the global pandemic, when there was surge capacity and regional healthcare crisis. MDM:  The patient was seen and evaluated by me in the intake area. Vital signs were reviewed and noted stable. Physical exam revealed a nontoxic-appearing 24year-old managing own secretions and in no respiratory distress. Appropriate testing was ordered. Results were reviewed by me upon completion. Results showed negative strep, flu, and COVID 19. Results were discussed with the patient and discharge plan was discussed. I have given the patient strict written and verbal instructions about care at home, follow-up, and signs and symptoms of worsening of condition and they did verbalize understanding. CRITICAL CARE:   None    CONSULTS:  None    PROCEDURES:  None    FINAL IMPRESSION      1. Viral pharyngitis    2. Viral URI with cough    3. Encounter for laboratory testing for COVID-19 virus          DISPOSITION/PLAN     1. Viral pharyngitis    2. Viral URI with cough    3. Encounter for laboratory testing for COVID-19 virus        PATIENT REFERRED TO:  [address] [place] [address],[address]  [facility]. [address]  [phone]    As needed      DISCHARGE MEDICATIONS:  New Prescriptions    No medications on file       (Please note that portions of this note were completed with a voice recognition program.  Efforts were made to edit the dictations but occasionally words are mis-transcribed.)    Alena Hyde PA-C 02/01/22 2:04 PM    ANGEL Artis PA-C  02/01/22 6919

## 2022-02-01 NOTE — Clinical Note
Anila Silva was seen and treated in our emergency department on 2/1/2022. He may return to work on 02/03/2022. If you have any questions or concerns, please don't hesitate to call.       Oly Carlos PA-C

## 2022-02-01 NOTE — ED NOTES
Patient presents to ED with c/o sore throat x2 days. States that he just wants to get tested. Denies any chest pain or shortness of breath.       Rizwan Lima RN  02/01/22 1793

## 2022-02-03 LAB — THROAT/NOSE CULTURE: NORMAL

## 2022-05-08 ENCOUNTER — HOSPITAL ENCOUNTER (INPATIENT)
Age: 22
LOS: 2 days | Discharge: PSYCHIATRIC OR SUBSTANCE USE FACILITY | DRG: 817 | End: 2022-05-10
Attending: EMERGENCY MEDICINE | Admitting: HOSPITALIST
Payer: COMMERCIAL

## 2022-05-08 DIAGNOSIS — T65.92XA INGESTION OF SUBSTANCE, INTENTIONAL SELF-HARM, INITIAL ENCOUNTER (HCC): Primary | ICD-10-CM

## 2022-05-08 PROBLEM — T14.91XA SUICIDE ATTEMPT (HCC): Status: ACTIVE | Noted: 2022-05-08

## 2022-05-08 LAB
ACETAMINOPHEN LEVEL: < 5 UG/ML (ref 0–20)
ALBUMIN SERPL-MCNC: 5.3 G/DL (ref 3.5–5.1)
ALP BLD-CCNC: 68 U/L (ref 38–126)
ALT SERPL-CCNC: 12 U/L (ref 11–66)
AMPHETAMINE+METHAMPHETAMINE URINE SCREEN: NEGATIVE
ANION GAP SERPL CALCULATED.3IONS-SCNC: 12 MEQ/L (ref 8–16)
AST SERPL-CCNC: 19 U/L (ref 5–40)
BARBITURATE QUANTITATIVE URINE: NEGATIVE
BASOPHILS # BLD: 0.5 %
BASOPHILS ABSOLUTE: 0 THOU/MM3 (ref 0–0.1)
BENZODIAZEPINE QUANTITATIVE URINE: NEGATIVE
BILIRUB SERPL-MCNC: 0.3 MG/DL (ref 0.3–1.2)
BUN BLDV-MCNC: 8 MG/DL (ref 7–22)
CALCIUM SERPL-MCNC: 9.9 MG/DL (ref 8.5–10.5)
CANNABINOID QUANTITATIVE URINE: POSITIVE
CHLORIDE BLD-SCNC: 103 MEQ/L (ref 98–111)
CO2: 25 MEQ/L (ref 23–33)
COCAINE METABOLITE QUANTITATIVE URINE: NEGATIVE
CREAT SERPL-MCNC: 0.7 MG/DL (ref 0.4–1.2)
EKG ATRIAL RATE: 83 BPM
EKG P AXIS: 85 DEGREES
EKG P-R INTERVAL: 150 MS
EKG Q-T INTERVAL: 334 MS
EKG QRS DURATION: 88 MS
EKG QTC CALCULATION (BAZETT): 392 MS
EKG R AXIS: 77 DEGREES
EKG T AXIS: 80 DEGREES
EKG VENTRICULAR RATE: 83 BPM
EOSINOPHIL # BLD: 0.4 %
EOSINOPHILS ABSOLUTE: 0 THOU/MM3 (ref 0–0.4)
ERYTHROCYTE [DISTWIDTH] IN BLOOD BY AUTOMATED COUNT: 13.6 % (ref 11.5–14.5)
ERYTHROCYTE [DISTWIDTH] IN BLOOD BY AUTOMATED COUNT: 46.4 FL (ref 35–45)
ETHYL ALCOHOL, SERUM: < 0.01 %
GFR SERPL CREATININE-BSD FRML MDRD: > 90 ML/MIN/1.73M2
GLUCOSE BLD-MCNC: 105 MG/DL (ref 70–108)
HCT VFR BLD CALC: 43.2 % (ref 42–52)
HEMOGLOBIN: 14.2 GM/DL (ref 14–18)
IMMATURE GRANS (ABS): 0.04 THOU/MM3 (ref 0–0.07)
IMMATURE GRANULOCYTES: 0.4 %
LYMPHOCYTES # BLD: 12.3 %
LYMPHOCYTES ABSOLUTE: 1.2 THOU/MM3 (ref 1–4.8)
MCH RBC QN AUTO: 30.6 PG (ref 26–33)
MCHC RBC AUTO-ENTMCNC: 32.9 GM/DL (ref 32.2–35.5)
MCV RBC AUTO: 93.1 FL (ref 80–94)
MONOCYTES # BLD: 6.1 %
MONOCYTES ABSOLUTE: 0.6 THOU/MM3 (ref 0.4–1.3)
NUCLEATED RED BLOOD CELLS: 0 /100 WBC
OPIATES, URINE: NEGATIVE
OSMOLALITY CALCULATION: 278.1 MOSMOL/KG (ref 275–300)
OXYCODONE: NEGATIVE
PHENCYCLIDINE QUANTITATIVE URINE: NEGATIVE
PLATELET # BLD: 290 THOU/MM3 (ref 130–400)
PMV BLD AUTO: 9.6 FL (ref 9.4–12.4)
POTASSIUM REFLEX MAGNESIUM: 4.1 MEQ/L (ref 3.5–5.2)
RBC # BLD: 4.64 MILL/MM3 (ref 4.7–6.1)
SALICYLATE, SERUM: < 0.3 MG/DL (ref 2–10)
SARS-COV-2, NAAT: NOT  DETECTED
SEG NEUTROPHILS: 80.3 %
SEGMENTED NEUTROPHILS ABSOLUTE COUNT: 7.9 THOU/MM3 (ref 1.8–7.7)
SODIUM BLD-SCNC: 140 MEQ/L (ref 135–145)
TOTAL PROTEIN: 7.6 G/DL (ref 6.1–8)
WBC # BLD: 9.9 THOU/MM3 (ref 4.8–10.8)

## 2022-05-08 PROCEDURE — 85025 COMPLETE CBC W/AUTO DIFF WBC: CPT

## 2022-05-08 PROCEDURE — G0378 HOSPITAL OBSERVATION PER HR: HCPCS

## 2022-05-08 PROCEDURE — 2580000003 HC RX 258: Performed by: HOSPITALIST

## 2022-05-08 PROCEDURE — 99285 EMERGENCY DEPT VISIT HI MDM: CPT

## 2022-05-08 PROCEDURE — 36415 COLL VENOUS BLD VENIPUNCTURE: CPT

## 2022-05-08 PROCEDURE — 87635 SARS-COV-2 COVID-19 AMP PRB: CPT

## 2022-05-08 PROCEDURE — 1200000003 HC TELEMETRY R&B

## 2022-05-08 PROCEDURE — 80179 DRUG ASSAY SALICYLATE: CPT

## 2022-05-08 PROCEDURE — 6370000000 HC RX 637 (ALT 250 FOR IP): Performed by: STUDENT IN AN ORGANIZED HEALTH CARE EDUCATION/TRAINING PROGRAM

## 2022-05-08 PROCEDURE — 80143 DRUG ASSAY ACETAMINOPHEN: CPT

## 2022-05-08 PROCEDURE — 93005 ELECTROCARDIOGRAM TRACING: CPT | Performed by: STUDENT IN AN ORGANIZED HEALTH CARE EDUCATION/TRAINING PROGRAM

## 2022-05-08 PROCEDURE — 1200000000 HC SEMI PRIVATE

## 2022-05-08 PROCEDURE — 99223 1ST HOSP IP/OBS HIGH 75: CPT | Performed by: HOSPITALIST

## 2022-05-08 PROCEDURE — 80053 COMPREHEN METABOLIC PANEL: CPT

## 2022-05-08 PROCEDURE — 82077 ASSAY SPEC XCP UR&BREATH IA: CPT

## 2022-05-08 PROCEDURE — 80307 DRUG TEST PRSMV CHEM ANLYZR: CPT

## 2022-05-08 RX ORDER — MAGNESIUM SULFATE IN WATER 40 MG/ML
2000 INJECTION, SOLUTION INTRAVENOUS PRN
Status: DISCONTINUED | OUTPATIENT
Start: 2022-05-08 | End: 2022-05-10 | Stop reason: HOSPADM

## 2022-05-08 RX ORDER — NICOTINE 21 MG/24HR
1 PATCH, TRANSDERMAL 24 HOURS TRANSDERMAL DAILY
Status: DISCONTINUED | OUTPATIENT
Start: 2022-05-08 | End: 2022-05-08

## 2022-05-08 RX ORDER — ALBUTEROL SULFATE 90 UG/1
2 AEROSOL, METERED RESPIRATORY (INHALATION) EVERY 4 HOURS PRN
Status: DISCONTINUED | OUTPATIENT
Start: 2022-05-08 | End: 2022-05-10 | Stop reason: HOSPADM

## 2022-05-08 RX ORDER — SODIUM CHLORIDE 0.9 % (FLUSH) 0.9 %
10 SYRINGE (ML) INJECTION EVERY 12 HOURS SCHEDULED
Status: DISCONTINUED | OUTPATIENT
Start: 2022-05-08 | End: 2022-05-10 | Stop reason: HOSPADM

## 2022-05-08 RX ORDER — POTASSIUM CHLORIDE 20 MEQ/1
40 TABLET, EXTENDED RELEASE ORAL PRN
Status: DISCONTINUED | OUTPATIENT
Start: 2022-05-08 | End: 2022-05-10 | Stop reason: HOSPADM

## 2022-05-08 RX ORDER — SODIUM CHLORIDE 0.9 % (FLUSH) 0.9 %
10 SYRINGE (ML) INJECTION PRN
Status: DISCONTINUED | OUTPATIENT
Start: 2022-05-08 | End: 2022-05-10 | Stop reason: HOSPADM

## 2022-05-08 RX ORDER — SUCRALFATE 1 G/1
1 TABLET ORAL 4 TIMES DAILY
Status: DISCONTINUED | OUTPATIENT
Start: 2022-05-08 | End: 2022-05-08

## 2022-05-08 RX ORDER — NICOTINE 21 MG/24HR
1 PATCH, TRANSDERMAL 24 HOURS TRANSDERMAL DAILY
Status: DISCONTINUED | OUTPATIENT
Start: 2022-05-08 | End: 2022-05-10 | Stop reason: HOSPADM

## 2022-05-08 RX ORDER — ACETAMINOPHEN 650 MG/1
650 SUPPOSITORY RECTAL EVERY 6 HOURS PRN
Status: DISCONTINUED | OUTPATIENT
Start: 2022-05-08 | End: 2022-05-10 | Stop reason: HOSPADM

## 2022-05-08 RX ORDER — POTASSIUM CHLORIDE 7.45 MG/ML
10 INJECTION INTRAVENOUS PRN
Status: DISCONTINUED | OUTPATIENT
Start: 2022-05-08 | End: 2022-05-10 | Stop reason: HOSPADM

## 2022-05-08 RX ORDER — ONDANSETRON 2 MG/ML
4 INJECTION INTRAMUSCULAR; INTRAVENOUS EVERY 6 HOURS PRN
Status: DISCONTINUED | OUTPATIENT
Start: 2022-05-08 | End: 2022-05-10 | Stop reason: HOSPADM

## 2022-05-08 RX ORDER — ACETAMINOPHEN 500 MG
1000 TABLET ORAL ONCE
Status: COMPLETED | OUTPATIENT
Start: 2022-05-08 | End: 2022-05-08

## 2022-05-08 RX ORDER — ACETAMINOPHEN 325 MG/1
650 TABLET ORAL EVERY 6 HOURS PRN
Status: DISCONTINUED | OUTPATIENT
Start: 2022-05-08 | End: 2022-05-10 | Stop reason: HOSPADM

## 2022-05-08 RX ORDER — ONDANSETRON 4 MG/1
4 TABLET, ORALLY DISINTEGRATING ORAL ONCE
Status: COMPLETED | OUTPATIENT
Start: 2022-05-08 | End: 2022-05-08

## 2022-05-08 RX ORDER — POLYETHYLENE GLYCOL 3350 17 G/17G
17 POWDER, FOR SOLUTION ORAL DAILY PRN
Status: DISCONTINUED | OUTPATIENT
Start: 2022-05-08 | End: 2022-05-10 | Stop reason: HOSPADM

## 2022-05-08 RX ORDER — ENOXAPARIN SODIUM 100 MG/ML
40 INJECTION SUBCUTANEOUS DAILY
Status: DISCONTINUED | OUTPATIENT
Start: 2022-05-08 | End: 2022-05-09

## 2022-05-08 RX ORDER — SODIUM CHLORIDE 9 MG/ML
INJECTION, SOLUTION INTRAVENOUS PRN
Status: DISCONTINUED | OUTPATIENT
Start: 2022-05-08 | End: 2022-05-10 | Stop reason: HOSPADM

## 2022-05-08 RX ORDER — SODIUM CHLORIDE 9 MG/ML
INJECTION, SOLUTION INTRAVENOUS CONTINUOUS
Status: DISCONTINUED | OUTPATIENT
Start: 2022-05-08 | End: 2022-05-09

## 2022-05-08 RX ORDER — PANTOPRAZOLE SODIUM 40 MG/1
40 TABLET, DELAYED RELEASE ORAL DAILY
Status: DISCONTINUED | OUTPATIENT
Start: 2022-05-08 | End: 2022-05-08

## 2022-05-08 RX ADMIN — SODIUM CHLORIDE: 9 INJECTION, SOLUTION INTRAVENOUS at 17:04

## 2022-05-08 RX ADMIN — ONDANSETRON 4 MG: 4 TABLET, ORALLY DISINTEGRATING ORAL at 14:57

## 2022-05-08 RX ADMIN — ACETAMINOPHEN 1000 MG: 500 TABLET ORAL at 14:58

## 2022-05-08 ASSESSMENT — ENCOUNTER SYMPTOMS
VOMITING: 0
NAUSEA: 0
PHOTOPHOBIA: 0
SINUS PAIN: 0
ABDOMINAL PAIN: 0
EYE REDNESS: 0
COUGH: 0
BACK PAIN: 0
SHORTNESS OF BREATH: 0
RHINORRHEA: 0
DIARRHEA: 0
SORE THROAT: 0

## 2022-05-08 NOTE — H&P
Hospitalist - History & Physical      Patient: Shanda Us    Unit/Bed:01/001A  YOB: 2000  MRN: [id number]   Acct: [de-identified]   PCP: No primary care provider on file. Date of Service: Pt seen/examined on 05/08/22  and Admitted to Inpatient with expected LOS greater than two midnights due to medical therapy. Chief Complaint: Suicide attempt    Assessment and Plan:-  1. Suicide attempt -patient tried to hang himself with patient and also took 8-10 tabs of Wellbutrin. Patient states that he took Wellbutrin just to feel better and he was not trying to overdose on Wellbutrin. Patient does not feel suicidal anymore and wants to be home. Suicide precaution. One-to-one observation. Psych consult. 2. Smoking -nicotine patch      History Of Present Illness: This is a 71-year-old male with past medical history significant for ADHD presenting with suicide attempt. Patient was trying to hang himself this morning with his clothing. Patient fell off immediately and his friend made him come to the hospital.  Patient also states that he took 8-10 tabs of Wellbutrin trying to feel better. Patient was tested positive for cannabinoid on drug screen. No other significant finding on ER evaluation. No significant trauma noted. Patient states that he feels a little better now and does not feel suicidal anymore. Past Medical History:        Diagnosis Date    ADHD (attention deficit hyperactivity disorder)        Past Surgical History:    History reviewed. No pertinent surgical history. Home Medications:   No current facility-administered medications on file prior to encounter.      Current Outpatient Medications on File Prior to Encounter   Medication Sig Dispense Refill    pantoprazole (PROTONIX) 40 MG tablet Take 1 tablet by mouth daily (Patient not taking: Reported on 5/8/2022) 90 tablet 0    sucralfate (CARAFATE) 1 GM tablet Take 1 tablet by mouth 4 times daily for 40 doses 40 tablet 0    albuterol sulfate  (90 Base) MCG/ACT inhaler Inhale 2 puffs into the lungs every 4 hours as needed for Wheezing 1 Inhaler 0    naproxen (NAPROSYN) 250 MG tablet Take 1 tablet by mouth 2 times daily (with meals) (Patient not taking: Reported on 5/8/2022) 30 tablet 0       Allergies:    Patient has no known allergies. Social History:    reports that he has been smoking. He has been smoking about 0.50 packs per day for the past 0.00 years. He has never used smokeless tobacco. He reports current alcohol use. He reports current drug use. Drug: Marijuana Soumya Smith). Family History:   History reviewed. No pertinent family history. Diet:  ADULT DIET; Regular    Review of systems:   Pertinent positives as noted in the HPI. All other systems reviewed and negative. PHYSICAL EXAM:  BP (!) 128/91   Pulse 68   Temp 98.5 °F (36.9 °C) (Oral)   Resp 16   Ht 5' 7\" (1.702 m)   Wt 144 lb (65.3 kg)   SpO2 98%   BMI 22.55 kg/m²   General appearance: No apparent distress, appears stated age and cooperative. HEENT: Normal cephalic, atraumatic without obvious deformity. Pupils equal, round, and reactive to light. Extra ocular muscles intact. Conjunctivae/corneas clear. Neck: Supple, with full range of motion. No jugular venous distention. Trachea midline. Respiratory:  Normal respiratory effort. Clear to auscultation, bilaterally without Rales/Wheezes/Rhonchi. Cardiovascular: Regular rate and rhythm with normal S1/S2 without murmurs, rubs or gallops. Abdomen: Soft, non-tender, non-distended with normal bowel sounds. Musculoskeletal:  No clubbing, cyanosis or edema bilaterally. Skin: Skin color, texture, turgor normal.  No rashes or lesions. Neurologic:  Neurovascularly intact without any focal sensory/motor deficits.  Cranial nerves: II-XII intact, grossly non-focal.  Psychiatric: Alert and oriented, thought content appropriate, normal insight  Capillary Refill: Brisk,< 3 seconds   Peripheral Pulses: +2 palpable, equal bilaterally     Labs:   Recent Labs     05/08/22  1401   WBC 9.9   HGB 14.2   HCT 43.2        Recent Labs     05/08/22  1401      K 4.1      CO2 25   BUN 8   CREATININE 0.7   CALCIUM 9.9     Recent Labs     05/08/22  1401   AST 19   ALT 12   BILITOT 0.3   ALKPHOS 68     No results for input(s): INR in the last 72 hours. No results for input(s): Connee Matar in the last 72 hours. Urinalysis:    No results found for: Alyne Claw, BACTERIA, RBCUA, BLOODU, [address], [place] [address]    Radiology:   No orders to display     No results found.       EKG: normal sinus rhythm, no blocks or conduction defects, no ischemic changes    Electronically signed by Marilu Gibbs DO on 5/8/2022 at 5:13 PM

## 2022-05-08 NOTE — ED NOTES
Poison controlled updated on pt at this time. No further recommendations at this time.      Latisha Brandon RN  05/08/22 7058

## 2022-05-08 NOTE — ED NOTES
Report received from Judit, [address] [place] [address]. Patient resting in bed with family at bedside. Respirations easy and unlabored. Denies further needs at this time. Sitter at bedside.        Latisha Brandon RN  05/08/22 1109

## 2022-05-08 NOTE — PROGRESS NOTES
4414 Clinician called medical provider and inquire if COVID-19 test could be done. Medical provider to order a COVID-19 test. Patient is not yet medically cleared.

## 2022-05-08 NOTE — ED NOTES
Pt arrives to ED from work with c/o suicide attempt at work, pt states he was at work when he went into the bathroom with the intention to hang himself. pt states he used his sweatshirt and tied it to a bar,when he attempted to hang himself the bar broke, his co workers heard him fall and came to help him. Pt states he also took about 10-15 Wellbutrin prior to attempt. Pt states he wants to end his life, has a plan, and plans to act on this. He came to ED for help. Pt states he feels more stress from being homeless,and general stress in life.  Not too much detail was offered at this time   Pt taken to room 1, sitter at bedside, suicide precautions ordered  UA collected      Chela Vasquez RN  05/08/22 5970

## 2022-05-08 NOTE — ED NOTES
Pt resting on cot at this time. Staff sitter remains at bedside watching patient d/t suicide risk. Pt updated on poc. Offers no concerns.       Jeremy Winston RN  05/08/22 2676

## 2022-05-08 NOTE — ED PROVIDER NOTES
Medications:     pantoprazole (PROTONIX) 40 MG tablet, Take 1 tablet by mouth daily (Patient not taking: Reported on 5/8/2022), Disp: 90 tablet, Rfl: 0    sucralfate (CARAFATE) 1 GM tablet, Take 1 tablet by mouth 4 times daily for 40 doses, Disp: 40 tablet, Rfl: 0    albuterol sulfate  (90 Base) MCG/ACT inhaler, Inhale 2 puffs into the lungs every 4 hours as needed for Wheezing, Disp: 1 Inhaler, Rfl: 0    naproxen (NAPROSYN) 250 MG tablet, Take 1 tablet by mouth 2 times daily (with meals) (Patient not taking: Reported on 5/8/2022), Disp: 30 tablet, Rfl: 0      SOCIAL HISTORY     Social History     Social History Narrative    Not on file     Social History     Tobacco Use    Smoking status: Current Every Day Smoker     Packs/day: 0.50     Years: 0.00     Pack years: 0.00    Smokeless tobacco: Never Used   Vaping Use    Vaping Use: Never used   Substance Use Topics    Alcohol use: Yes     Comment: rare    Drug use: Yes     Types: Marijuana (Weed)         ALLERGIES   No Known Allergies      FAMILY HISTORY   History reviewed. No pertinent family history. PREVIOUS RECORDS   Previous records reviewed: NA      PHYSICAL EXAM     ED Triage Vitals   BP Temp Temp Source Pulse Resp SpO2 Height Weight   05/08/22 1327 05/08/22 1326 05/08/22 1326 05/08/22 1327 05/08/22 1326 05/08/22 1326 -- 05/08/22 1326   (!) 146/95 98.5 °F (36.9 °C) Oral 98 20 98 %  144 lb (65.3 kg)     Initial vital signs and nursing assessment reviewed and normal. Pulsoximetry is normal per my interpretation. Additional Vital Signs:  Vitals:    05/08/22 1327   BP: (!) 146/95   Pulse: 98   Resp:    Temp:    SpO2:        Physical Exam  Vitals and nursing note reviewed. Constitutional:       General: He is not in acute distress. Appearance: He is not toxic-appearing. HENT:      Head: Normocephalic and atraumatic.       Right Ear: External ear normal.      Left Ear: External ear normal.      Nose: Nose normal.      Mouth/Throat: Mouth: Mucous membranes are moist.      Pharynx: Oropharynx is clear. Eyes:      General: No visual field deficit or scleral icterus. Conjunctiva/sclera: Conjunctivae normal.   Cardiovascular:      Rate and Rhythm: Normal rate and regular rhythm. Pulses: Normal pulses. Heart sounds: Normal heart sounds. Pulmonary:      Effort: Pulmonary effort is normal. No respiratory distress. Breath sounds: Normal breath sounds. Abdominal:      General: Abdomen is flat. There is no distension. Palpations: Abdomen is soft. Tenderness: There is no abdominal tenderness. There is no guarding or rebound. Musculoskeletal:         General: Normal range of motion. Cervical back: Normal range of motion and neck supple. No rigidity. No muscular tenderness. Lymphadenopathy:      Cervical: No cervical adenopathy. Skin:     General: Skin is warm and dry. Capillary Refill: Capillary refill takes less than 2 seconds. Coloration: Skin is not jaundiced. Neurological:      General: No focal deficit present. Mental Status: He is alert and oriented to person, place, and time. GCS: GCS eye subscore is 4. GCS verbal subscore is 5. GCS motor subscore is 6. Cranial Nerves: Cranial nerves are intact. No cranial nerve deficit, dysarthria or facial asymmetry. Sensory: Sensation is intact. No sensory deficit. Motor: Motor function is intact. No weakness, tremor, atrophy, abnormal muscle tone, seizure activity or pronator drift. Coordination: Coordination is intact. Coordination normal.   Psychiatric:         Mood and Affect: Mood normal.         Behavior: Behavior normal.           MEDICAL DECISION MAKING   Initial Assessment:  This is a 48-year-old male with past medical history of ADHD who attempted hanging himself earlier today with his sure he immediately fell there was no actual asphyxiation or neck injury, patient also mention taking 8-10 of his Wellbutrin unsure the dose, when reviewing his medication list this is not his either. Denies any other illicit substance use. Does have remote history of illicit substance use of cocaine methamphetamines and other drugs except for heroin he states however he has been sober 3 months. He is currently suicidal in the emergency room. Denies any homicidal ideation. Denies any auditory visual hallucinations. MDM:   After discussing the case the [facility] and the advisement for admission for possible seizure risk. He will be admitted to the hospitalist with a psychiatric consult placed. ED RESULTS   Laboratory results:  Labs Reviewed   CBC WITH AUTO DIFFERENTIAL - Abnormal; Notable for the following components:       Result Value    RBC 4.64 (*)     RDW-SD 46.4 (*)     Segs Absolute 7.9 (*)     All other components within normal limits   COMPREHENSIVE METABOLIC PANEL W/ REFLEX TO MG FOR LOW K - Abnormal; Notable for the following components:    Albumin 5.3 (*)     All other components within normal limits   SALICYLATE LEVEL - Abnormal; Notable for the following components:    Salicylate, Serum < 0.3 (*)     All other components within normal limits   COVID-19, RAPID   ACETAMINOPHEN LEVEL   ETHANOL   URINE DRUG SCREEN   ANION GAP   GLOMERULAR FILTRATION RATE, ESTIMATED   OSMOLALITY       Radiologic studies results:  No orders to display       ED Medications administered this visit:   Medications   acetaminophen (TYLENOL) tablet 1,000 mg (1,000 mg Oral Given 5/8/22 1458)   ondansetron (ZOFRAN-ODT) disintegrating tablet 4 mg (4 mg Oral Given 5/8/22 1457)         ED COURSE     ED Course as of 05/08/22 1515   Sun May 08, 2022   1509 After discussing the case with the [facility] they advised 24 hours of observation as there is significant risk with Wellbutrin for seizures.   Hospitalist was asked for admission for [AL]      ED Course User Index  [AL] Addi Sellers MD         MEDICATION CHANGES New Prescriptions    No medications on file         FINAL DISPOSITION     Final diagnoses:   Ingestion of substance, intentional self-harm, initial encounter ([address]     Condition: condition: stable  Dispo: Admit to telemetry      This transcription was electronically signed. Parts of this transcriptions may have been dictated by use of voice recognition software and electronically transcribed, and parts may have been transcribed with the assistance of an ED scribe. The transcription may contain errors not detected in proofreading. Please refer to my supervising physician's documentation if my documentation differs.     Electronically Signed: Jami Dinero MD, 05/08/22, 3:15 PM         Jami Dinero MD  Resident  05/08/22 1095

## 2022-05-08 NOTE — ED PROVIDER NOTES
[address]\"" [address] [address] EMERGENCY DEPT  ED Attending Physician Attestation     I performed a history and physical examination of the patient and discussed management with the Resident. I reviewed the Resident's note and agree with the documented findings and plan of care. Any areas of disagreement are noted on the chart. I was personally present for the key portions of any procedures and have documented in the chart those procedures where I was not present during the key portions. I have reviewed the emergency nurses triage note and agree with the chief complaint, past medical history, past surgical history, allergies, medications, social and family history as documented unless otherwise noted below. Intentional suicide attempt by hanging  Patient states he tied his sweatshirt around his neck and around the bar, but the bar gave out under his weight and he fell to the ground onto his knees. Did not lose postural tone. He hung for maybe 5 seconds, states was extremely brief.   Also took 8 or 9 Wellbutrin non extended release approximately 3 hours ago  Denies any other coingestants    + History of previous suicide attempts in the past  HEENT WNl  No petechiae/hemorrhages (subconj or mucosal)  Neck nontender; cspine nontender  No carotid bruits  Chest Lungs clear  HRRR no mcg  Abd SNT    Plan: suicide precautions w/ sitter  Medical clearance (EKG, labs, coingestants), monitoring  Once medically cleared will need psychiatric placement    Emily Sutton [facility]  Attending Emergency Physician        Megha Marinelli, DO  05/08/22 Tay Shafer, DO  05/08/22 4138

## 2022-05-09 LAB
ALBUMIN SERPL-MCNC: 4.6 G/DL (ref 3.5–5.1)
ALP BLD-CCNC: 58 U/L (ref 38–126)
ALT SERPL-CCNC: 11 U/L (ref 11–66)
ANION GAP SERPL CALCULATED.3IONS-SCNC: 15 MEQ/L (ref 8–16)
AST SERPL-CCNC: 21 U/L (ref 5–40)
BASOPHILS # BLD: 0.5 %
BASOPHILS ABSOLUTE: 0 THOU/MM3 (ref 0–0.1)
BILIRUB SERPL-MCNC: 0.7 MG/DL (ref 0.3–1.2)
BUN BLDV-MCNC: 8 MG/DL (ref 7–22)
CALCIUM SERPL-MCNC: 9.3 MG/DL (ref 8.5–10.5)
CHLORIDE BLD-SCNC: 107 MEQ/L (ref 98–111)
CO2: 18 MEQ/L (ref 23–33)
CREAT SERPL-MCNC: 0.6 MG/DL (ref 0.4–1.2)
EKG ATRIAL RATE: 76 BPM
EKG P AXIS: 78 DEGREES
EKG P-R INTERVAL: 146 MS
EKG Q-T INTERVAL: 334 MS
EKG QRS DURATION: 86 MS
EKG QTC CALCULATION (BAZETT): 375 MS
EKG R AXIS: 77 DEGREES
EKG T AXIS: 78 DEGREES
EKG VENTRICULAR RATE: 76 BPM
EOSINOPHIL # BLD: 1.7 %
EOSINOPHILS ABSOLUTE: 0.1 THOU/MM3 (ref 0–0.4)
ERYTHROCYTE [DISTWIDTH] IN BLOOD BY AUTOMATED COUNT: 13.9 % (ref 11.5–14.5)
ERYTHROCYTE [DISTWIDTH] IN BLOOD BY AUTOMATED COUNT: 47.3 FL (ref 35–45)
GFR SERPL CREATININE-BSD FRML MDRD: > 90 ML/MIN/1.73M2
GLUCOSE BLD-MCNC: 89 MG/DL (ref 70–108)
HCT VFR BLD CALC: 42.1 % (ref 42–52)
HEMOGLOBIN: 13.6 GM/DL (ref 14–18)
HIV AG/AB: NONREACTIVE
IMMATURE GRANS (ABS): 0.02 THOU/MM3 (ref 0–0.07)
IMMATURE GRANULOCYTES: 0.3 %
LYMPHOCYTES # BLD: 25.5 %
LYMPHOCYTES ABSOLUTE: 1.7 THOU/MM3 (ref 1–4.8)
MCH RBC QN AUTO: 30.4 PG (ref 26–33)
MCHC RBC AUTO-ENTMCNC: 32.3 GM/DL (ref 32.2–35.5)
MCV RBC AUTO: 94 FL (ref 80–94)
MONOCYTES # BLD: 8.2 %
MONOCYTES ABSOLUTE: 0.5 THOU/MM3 (ref 0.4–1.3)
NUCLEATED RED BLOOD CELLS: 0 /100 WBC
OSMOLALITY CALCULATION: 277.2 MOSMOL/KG (ref 275–300)
PLATELET # BLD: 229 THOU/MM3 (ref 130–400)
PMV BLD AUTO: 9.7 FL (ref 9.4–12.4)
POTASSIUM REFLEX MAGNESIUM: 3.8 MEQ/L (ref 3.5–5.2)
RBC # BLD: 4.48 MILL/MM3 (ref 4.7–6.1)
SEG NEUTROPHILS: 63.8 %
SEGMENTED NEUTROPHILS ABSOLUTE COUNT: 4.1 THOU/MM3 (ref 1.8–7.7)
SODIUM BLD-SCNC: 140 MEQ/L (ref 135–145)
TOTAL PROTEIN: 6.7 G/DL (ref 6.1–8)
WBC # BLD: 6.5 THOU/MM3 (ref 4.8–10.8)

## 2022-05-09 PROCEDURE — 99232 SBSQ HOSP IP/OBS MODERATE 35: CPT | Performed by: INTERNAL MEDICINE

## 2022-05-09 PROCEDURE — 36415 COLL VENOUS BLD VENIPUNCTURE: CPT

## 2022-05-09 PROCEDURE — 96372 THER/PROPH/DIAG INJ SC/IM: CPT

## 2022-05-09 PROCEDURE — 6360000002 HC RX W HCPCS: Performed by: HOSPITALIST

## 2022-05-09 PROCEDURE — 87389 HIV-1 AG W/HIV-1&-2 AB AG IA: CPT

## 2022-05-09 PROCEDURE — 80053 COMPREHEN METABOLIC PANEL: CPT

## 2022-05-09 PROCEDURE — 80074 ACUTE HEPATITIS PANEL: CPT

## 2022-05-09 PROCEDURE — 85025 COMPLETE CBC W/AUTO DIFF WBC: CPT

## 2022-05-09 PROCEDURE — 93010 ELECTROCARDIOGRAM REPORT: CPT | Performed by: INTERNAL MEDICINE

## 2022-05-09 PROCEDURE — 1200000000 HC SEMI PRIVATE

## 2022-05-09 PROCEDURE — 1200000003 HC TELEMETRY R&B

## 2022-05-09 PROCEDURE — G0378 HOSPITAL OBSERVATION PER HR: HCPCS

## 2022-05-09 PROCEDURE — 6370000000 HC RX 637 (ALT 250 FOR IP): Performed by: STUDENT IN AN ORGANIZED HEALTH CARE EDUCATION/TRAINING PROGRAM

## 2022-05-09 RX ORDER — BUPROPION HYDROCHLORIDE 100 MG/1
100 TABLET, EXTENDED RELEASE ORAL 2 TIMES DAILY
Status: ON HOLD | COMMUNITY
End: 2022-05-13 | Stop reason: HOSPADM

## 2022-05-09 RX ADMIN — ENOXAPARIN SODIUM 40 MG: 100 INJECTION SUBCUTANEOUS at 00:03

## 2022-05-09 ASSESSMENT — LIFESTYLE VARIABLES: HOW OFTEN DO YOU HAVE A DRINK CONTAINING ALCOHOL: PATIENT DECLINED

## 2022-05-09 NOTE — PLAN OF CARE
Problem: Self Harm/Suicidality  Goal: Will have no self-injury during hospital stay  Description: INTERVENTIONS:  1. Q 30 MINUTES: Routine safety checks  2. Q SHIFT & PRN: Assess risk to determine if routine checks are adequate to maintain patient safety  Outcome: Progressing  NO SELF INJURIES FOR SHIFT. Patient has sitter present at bedside. Problem: Safety - Adult  Goal: Free from fall injury  Outcome: Progressing  Fall assessment completed. Patient using call light appropriately to call for assistance with ambulation to bathroom. Personal items within reach. Patient is also compliant with use of non-skid slippers. Problem: Discharge Planning  Goal: Discharge to home or other facility with appropriate resources  Outcome: Progressing  Discharge plan is in process. Plan discharge home with family. Psych evaluation pending. Care plan reviewed with patient and family. Patient and family verbalize understanding of the plan of care and contribute to goal setting.

## 2022-05-09 NOTE — ED NOTES
Pt in bed sleeping at this time. Wakes to voice. No s/s of distress noted. Sitter at bedside.      Igor Dennis RN  05/09/22 1400

## 2022-05-09 NOTE — ED NOTES
Patient resting in bed. Respirations easy and unlabored. No distress noted. Call light within reach. Sitter at bedside.        Villa Melendrez RN  05/08/22 3791

## 2022-05-09 NOTE — ED NOTES
Patient resting in bed. Respirations easy and unlabored. No distress noted. Sitter at bedside.        Kenny Kent RN  05/08/22 2033

## 2022-05-09 NOTE — ED NOTES
ED nurse-to-nurse bedside report    Chief Complaint   Patient presents with    Suicide Attempt      LOC: alert and orientated to name, place, date  Vital signs   Vitals:    05/08/22 2330 05/09/22 0015 05/09/22 0100 05/09/22 0230   BP: 120/81 122/85 118/71 111/68   Pulse: 58 63 58 92   Resp: 15 17 15 24   Temp:       TempSrc:       SpO2: 100% 92% 98% 97%   Weight:       Height:          Pain:    Pain Interventions: rest/reposition  Pain Goal: 0  Oxygen: No    Current needs required 0   Telemetry: Yes  LDAs:   Peripheral IV 05/08/22 Left Forearm (Active)   Site Assessment Clean, dry & intact 05/09/22 0003   Line Status Infusing 05/09/22 0003   Dressing Status Clean, dry & intact 05/09/22 0003   Dressing Type Transparent 05/09/22 0003     Continuous Infusions:    sodium chloride      sodium chloride 100 mL/hr at 05/08/22 1704     Mobility: Independent  Rubio Fall Risk Score: No flowsheet data found.   Fall Interventions: siderails x2, call light within reach  Report given to: Leroy Christopher, LINDSAY Shah RN  05/09/22 3096

## 2022-05-09 NOTE — ED NOTES
Patient resting in bed with eyes closed. Respirations easy and unlabored. No distress noted. Sitter remains at bedside.        Latoya Watts RN  05/09/22 8598

## 2022-05-09 NOTE — ED NOTES
Patient resting in bed. Respirations easy and unlabored. No distress noted. Call light within reach. Sitter at bedside.        Kartik Andre RN  05/08/22 3636

## 2022-05-09 NOTE — PROGRESS NOTES
Assessment and Plan:        1. Suicide attempt- stable, no aftereffects- Dr ARELLANO to see      CC:  \" I had a break\"  HPI:  Pt with prior hx of PAULINA, tried hanging himself and took buproprion 8-10; brought to ER. Hx ADHD. ROS (12 point review of systems completed. Pertinent positives noted. Otherwise ROS is negative) :     PMH:  Per HPI  SHX:  Single, smoker; sees councillor  FHX: n/a  Allergies: See above    Medications:     sodium chloride        sodium chloride flush  10 mL IntraVENous 2 times per day    nicotine  1 patch TransDERmal Daily       Vital Signs:   /66   Pulse 61   Temp 97.6 °F (36.4 °C) (Oral)   Resp 16   Ht 5' 7\" (1.702 m)   Wt 144 lb (65.3 kg)   SpO2 99%   BMI 22.55 kg/m²    No intake or output data in the 24 hours ending 05/09/22 1112     Physical Examination: General appearance - alert, well appearing, and in no distress  Mental status - alert, oriented to person, place, and time  Neck - supple, no significant adenopathy, no JVD, or carotid bruits  Chest - clear to auscultation, no wheezes, rales or rhonchi, symmetric air entry  Heart - normal rate, regular rhythm, normal S1, S2, no murmurs, rubs, clicks or gallops  Abdomen - soft, nontender, nondistended, no masses or organomegaly  Neurological - alert, oriented, normal speech, no focal findings or movement disorder noted  Musculoskeletal - no joint tenderness, deformity or swelling  Extremities - peripheral pulses normal, no pedal edema, no clubbing or cyanosis  Skin - normal coloration and turgor, no rashes, no suspicious skin lesions noted. Multiple tattoos    Data: (All radiographs, tracings, PFTs, and imaging are personally viewed and interpreted unless otherwise noted).            Electronically signed by Esmer Salguero MD on 5/9/2022 at 11:12 AM

## 2022-05-09 NOTE — ED NOTES
Pt transferred to room 32 and report given to Arianne Jarquin RN. Sitter remains at bedside.      Alden Davis RN  05/09/22 7089

## 2022-05-09 NOTE — ED NOTES
Patient resting in bed. Respirations easy and unlabored. Denies further needs at this time. Sitter remains at bedside.        Julia Gonzalez RN  05/09/22 0030

## 2022-05-09 NOTE — ED NOTES
Pt continues to lay in bed and sleep. No s/s of distress noted. Sitter at bedside.      Mars Wray RN  05/09/22 1986

## 2022-05-09 NOTE — ED NOTES
Patient resting in bed with eyes closed. Respirations easy and unlabored. No distress noted. Sitter remains at bedside.        Lissette Kuhn RN  05/09/22 1017

## 2022-05-09 NOTE — ED NOTES
ED to inpatient nurses report    Chief Complaint   Patient presents with    Suicide Attempt      Present to ED from home  LOC: alert and orientated to name, place, date  Vital signs   Vitals:    05/08/22 2130 05/08/22 2230 05/08/22 2330 05/09/22 0015   BP: 119/78 113/72 120/81 122/85   Pulse: 63 56 58 63   Resp: 23 16 15 17   Temp:       TempSrc:       SpO2: 97% 98% 100% 92%   Weight:       Height:          Oxygen Baseline 0    Current needs required 0   LDAs:   Peripheral IV 05/08/22 Left Forearm (Active)   Site Assessment Clean, dry & intact 05/09/22 0003   Line Status Infusing 05/09/22 0003   Dressing Status Clean, dry & intact 05/09/22 0003   Dressing Type Transparent 05/09/22 0003     Mobility: Independent  Pending ED orders: None  Present condition: Stable      Electronically signed by Valentina Casey, RN on 5/9/2022 at 12:50 AM       Valentina Casey RN  05/09/22 6316

## 2022-05-09 NOTE — CARE COORDINATION
5/9/22, 4:28 PM EDT  DISCHARGE PLANNING EVALUATION:    Ami Face       Admitted: 5/8/2022/ [address] day: 1   Location: -08/008-A Reason for admit: Suicide attempt ([facility] [T14.91XA]  Ingestion of substance, intentional self-harm, initial encounter ([facility] Estrella Hwang   PMH:  has a past medical history of ADHD (attention deficit hyperactivity disorder). Procedure: N/A  Barriers to Discharge:  Patient presents due to suicide attempt at work. Psychiatry consulted, sitter at bedside, Nicotine patch, prn Tylenol and Zofran, Magnesium and Potassium replacement protocol, up with assistance. PCP: No primary care provider on file. Readmission Risk Score: 6.4 ( )%  Patient's Healthcare Decision Maker: Patient Declined (Legal Next of Kin Remains as Decision Maker)    Patient Goals/Plan/Treatment Preferences: Met with Brant Rodirguez. He resides at home with his mother. He does not have a PCP and has been going to [facility] drug rehabilitation program. He has been going there for quite some time. He has a counselor and  there. He is getting ready to transfer to the next level of the program. He agrees to getting a PCP. He states he [place] one block from the hospital and will walk home at discharge. Brant Rodriguez denies any needs and is hopeful for discharge soon. Transportation/Food Security/Housekeeping Addressed:  No issues identified.

## 2022-05-09 NOTE — PROGRESS NOTES
Reinforced suicide precautions with patient. Reinforced that visitors will be screened and may be limited at the discretion of the nurse. Visitor belongings are subject to be searched. Belongings may not be allowed into the patient room. Reviewed any personal belongings from the previous shift believed to be a threat to patient safety removed from room. Belongings removed include: clothes  Placed in secure area locked . Room screened for safety, items removed to create a safe environment include:   Blood pressure cuff   Loose or extra cords, tubing (not of medical necessity)   Extra Linens   Telephone   Toiletries   Trash Liners   Patient's cell phone and charging cord (must be removed from room)      Safety tray ordered: yes    Expectations were discussed with sitter (unit support aide). Sitter positioned near exit. Sitter reminded that patient should be observed at all times including toileting and bathing. Sitter has security radio and documentation sheet. Patient and sitter included in hourly rounds.

## 2022-05-09 NOTE — ED NOTES
Pt in bed sleeping at this time. Wakes to voice and is A&Ox4. Voiced no needs. Sitter at bedside. Call light in reach.      Zack Greenberg RN  05/09/22 8750

## 2022-05-10 ENCOUNTER — HOSPITAL ENCOUNTER (OUTPATIENT)
Age: 22
Setting detail: OBSERVATION
Discharge: HOME | DRG: 817 | End: 2022-05-13
Attending: PSYCHIATRY & NEUROLOGY | Admitting: PSYCHIATRY & NEUROLOGY
Payer: COMMERCIAL

## 2022-05-10 VITALS
TEMPERATURE: 98 F | BODY MASS INDEX: 22.6 KG/M2 | RESPIRATION RATE: 18 BRPM | DIASTOLIC BLOOD PRESSURE: 92 MMHG | HEIGHT: 67 IN | HEART RATE: 61 BPM | OXYGEN SATURATION: 98 % | WEIGHT: 144 LBS | SYSTOLIC BLOOD PRESSURE: 127 MMHG

## 2022-05-10 PROBLEM — F33.9 MDD (MAJOR DEPRESSIVE DISORDER), RECURRENT EPISODE (HCC): Status: ACTIVE | Noted: 2022-05-10

## 2022-05-10 PROCEDURE — 6370000000 HC RX 637 (ALT 250 FOR IP): Performed by: PSYCHIATRY & NEUROLOGY

## 2022-05-10 PROCEDURE — 1240000000 HC EMOTIONAL WELLNESS R&B

## 2022-05-10 PROCEDURE — G0378 HOSPITAL OBSERVATION PER HR: HCPCS

## 2022-05-10 PROCEDURE — 94760 N-INVAS EAR/PLS OXIMETRY 1: CPT

## 2022-05-10 PROCEDURE — 90792 PSYCH DIAG EVAL W/MED SRVCS: CPT | Performed by: PSYCHIATRY & NEUROLOGY

## 2022-05-10 PROCEDURE — 99238 HOSP IP/OBS DSCHRG MGMT 30/<: CPT | Performed by: INTERNAL MEDICINE

## 2022-05-10 PROCEDURE — 6370000000 HC RX 637 (ALT 250 FOR IP): Performed by: STUDENT IN AN ORGANIZED HEALTH CARE EDUCATION/TRAINING PROGRAM

## 2022-05-10 RX ORDER — MAGNESIUM HYDROXIDE/ALUMINUM HYDROXICE/SIMETHICONE 120; 1200; 1200 MG/30ML; MG/30ML; MG/30ML
30 SUSPENSION ORAL EVERY 6 HOURS PRN
Status: DISCONTINUED | OUTPATIENT
Start: 2022-05-10 | End: 2022-05-13 | Stop reason: HOSPADM

## 2022-05-10 RX ORDER — HYDROXYZINE HYDROCHLORIDE 25 MG/1
50 TABLET, FILM COATED ORAL 3 TIMES DAILY PRN
Status: DISCONTINUED | OUTPATIENT
Start: 2022-05-10 | End: 2022-05-13 | Stop reason: HOSPADM

## 2022-05-10 RX ORDER — TRAZODONE HYDROCHLORIDE 50 MG/1
50 TABLET ORAL NIGHTLY PRN
Status: DISCONTINUED | OUTPATIENT
Start: 2022-05-10 | End: 2022-05-12

## 2022-05-10 RX ORDER — IBUPROFEN 400 MG/1
400 TABLET ORAL EVERY 6 HOURS PRN
Status: DISCONTINUED | OUTPATIENT
Start: 2022-05-10 | End: 2022-05-13 | Stop reason: HOSPADM

## 2022-05-10 RX ORDER — NICOTINE 21 MG/24HR
1 PATCH, TRANSDERMAL 24 HOURS TRANSDERMAL DAILY
Status: CANCELLED | OUTPATIENT
Start: 2022-05-10

## 2022-05-10 RX ORDER — NICOTINE 21 MG/24HR
1 PATCH, TRANSDERMAL 24 HOURS TRANSDERMAL DAILY
Status: DISCONTINUED | OUTPATIENT
Start: 2022-05-11 | End: 2022-05-13 | Stop reason: HOSPADM

## 2022-05-10 RX ORDER — ACETAMINOPHEN 325 MG/1
650 TABLET ORAL EVERY 4 HOURS PRN
Status: DISCONTINUED | OUTPATIENT
Start: 2022-05-10 | End: 2022-05-13 | Stop reason: HOSPADM

## 2022-05-10 RX ORDER — ALBUTEROL SULFATE 90 UG/1
2 AEROSOL, METERED RESPIRATORY (INHALATION) EVERY 4 HOURS PRN
Status: CANCELLED | OUTPATIENT
Start: 2022-05-10

## 2022-05-10 RX ORDER — ALBUTEROL SULFATE 90 UG/1
2 AEROSOL, METERED RESPIRATORY (INHALATION) EVERY 4 HOURS PRN
Status: DISCONTINUED | OUTPATIENT
Start: 2022-05-10 | End: 2022-05-13 | Stop reason: HOSPADM

## 2022-05-10 RX ADMIN — TRAZODONE HYDROCHLORIDE 50 MG: 50 TABLET ORAL at 20:55

## 2022-05-10 RX ADMIN — HYDROXYZINE HYDROCHLORIDE 50 MG: 25 TABLET, FILM COATED ORAL at 20:55

## 2022-05-10 ASSESSMENT — LIFESTYLE VARIABLES
HOW OFTEN DO YOU HAVE A DRINK CONTAINING ALCOHOL: MONTHLY OR LESS
HOW MANY STANDARD DRINKS CONTAINING ALCOHOL DO YOU HAVE ON A TYPICAL DAY: 1 OR 2

## 2022-05-10 ASSESSMENT — SLEEP AND FATIGUE QUESTIONNAIRES
DO YOU HAVE DIFFICULTY SLEEPING: NO
DO YOU USE A SLEEP AID: NO
AVERAGE NUMBER OF SLEEP HOURS: 6

## 2022-05-10 ASSESSMENT — PATIENT HEALTH QUESTIONNAIRE - PHQ9: SUM OF ALL RESPONSES TO PHQ QUESTIONS 1-9: 9
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
## 2022-05-10 NOTE — PROGRESS NOTES
Poison control called at this time for updated on patient. Updated on patient condition. Informed on last set of vitals and mental status. They are aware of transfer to inpatient psych.

## 2022-05-10 NOTE — PROGRESS NOTES
Patient requesting STD testing. RN asked if he was having any symptoms related to sexually transmitted diseases - patient denied. Patient states the girl he is talking to is requesting a routine test be done to check for STDs. RN explained these tests are not usually done inpatient unless related to diagnosis.

## 2022-05-10 NOTE — PROGRESS NOTES
Report called to Radha Ly on 4E. Patient going into bed 4E 58A. Patient notified of transfer within next hour and he is notifying his mother. Dr. Claudean Sines at this time regarding Yoel Lott paperwork being filled out.

## 2022-05-10 NOTE — PLAN OF CARE
This RN has reviewed and agrees with Shruthi Beach LPN's data collection and has collaborated with this LPN regarding the patient's care plan.

## 2022-05-10 NOTE — PROGRESS NOTES
Spoke with Jono Antony, 4E charge. Updated there are no open beds at this time. She has discharges between approximately 1-2 pm today and a bed should be available after.

## 2022-05-10 NOTE — PROGRESS NOTES
Reinforced suicide precautions with patient. Reinforced that visitors will be screened and may be limited at the discretion of the nurse. Visitor belongings are subject to be searched. Belongings may not be allowed into the patient room. Reviewed any personal belongings from the previous shift believed to be a threat to patient safety removed from room. Belongings removed include: jacket, pill bottle, cell phone  Placed in secure area locked cabinet . Room screened for safety, items removed to create a safe environment include:   Blood pressure cuff   Loose or extra cords, tubing (not of medical necessity)   Extra Linens   Telephone   Toiletries   Trash Liners   Patient's cell phone and charging cord (must be removed from room)      Safety tray ordered: yes    Expectations were discussed with sitter (unit support aide). Sitter positioned near exit. Sitter reminded that patient should be observed at all times including toileting and bathing. Sitter has security radio and documentation sheet. Patient and sitter included in hourly rounds.

## 2022-05-10 NOTE — FLOWSHEET NOTE
05/10/22 0824   Treatment Team Notification   Reason for Communication Review case   Team Member Name Dr. Tanya Coombs Team Role Attending Provider   Method of Communication Secure Message   Response Waiting for response   Notification Time 4071     Notified Dr. Benoit Back evaluated patient and decided to admit to inpatient psych unit 4E.

## 2022-05-10 NOTE — CARE COORDINATION
Mayur Saleem is planned for discharge today and re-admit to Adult Psych. 5/10/22, 10:49 AM EDT    Patient goals/plan/ treatment preferences discussed by  and . Patient goals/plan/ treatment preferences reviewed with patient/ family. Patient/ family verbalize understanding of discharge plan and are in agreement with goal/plan/treatment preferences. Understanding was demonstrated using the teach back method. AVS provided by RN at time of discharge, which includes all necessary medical information pertaining to the patients current course of illness, treatment, post-discharge goals of care, and treatment preferences.      Services At/After Discharge: None

## 2022-05-10 NOTE — PROGRESS NOTES
Psych called and notified patient being transported. [place] police here to escort patient. Patient belongings collected. EMC, last dose mar and AVS placed in blue envelope - handed to campus police. Patient left in wheelchair to . Family aware of transfer.

## 2022-05-10 NOTE — DISCHARGE SUMMARY
Discharge Summary    Patient:  Viola Friend  YOB: 2000    MRN: [id number]   Acct: [de-identified]    Primary Care Physician: No primary care provider on file. Admit date:  5/8/2022    Discharge date:   5/10/2022        Discharge Diagnoses:   Suicide attempt [facility])  Principal Problem:    Suicide attempt [facility])  Resolved Problems:    * No resolved hospital problems. *        Admitted for: (HPI) suicide attempt    Hospital Course: This is a young gentleman who was brought to ER after taking 8-10 well butrin and apparently trying to hang himself. He was admitted to Hospitalist Service. His medical course was unremarkable. He was seen by Psychiatry who recommended transfer to .   He will follow up after that either with Psychiatry or his previous therapist.     Consultants:  Psychiatry    Discharge Medications:       Medication List      ASK your doctor about these medications    albuterol sulfate  (90 Base) MCG/ACT inhaler  Inhale 2 puffs into the lungs every 4 hours as needed for Wheezing     naproxen 250 MG tablet  Commonly known as: NAPROSYN  Take 1 tablet by mouth 2 times daily (with meals)     pantoprazole 40 MG tablet  Commonly known as: Protonix  Take 1 tablet by mouth daily     sucralfate 1 GM tablet  Commonly known as: Carafate  Take 1 tablet by mouth 4 times daily for 40 doses     Wellbutrin  MG extended release tablet  Generic drug: buPROPion              Physical Exam:    Vitals:  Vitals:    05/09/22 2115 05/10/22 0530 05/10/22 0915 05/10/22 1033   BP: (!) 130/50 90/76 (!) 127/92    Pulse: 53 56 61    Resp: 16 16 18 18   Temp: 98.4 °F (36.9 °C) 97.7 °F (36.5 °C) 98 °F (36.7 °C)    TempSrc: Oral Oral Oral    SpO2: 99% 98% 99% 98%   Weight:       Height:         Weight: Weight: 144 lb (65.3 kg)     24 hour intake/output:    Intake/Output Summary (Last 24 hours) at 5/10/2022 1154  Last data filed at 5/10/2022 0923  Gross per 24 hour   Intake 500 ml   Output 600 ml   Net -100 ml       General appearance - uncooperative and agitated  Chest - clear to auscultation, no wheezes, rales or rhonchi, symmetric air entry  Heart - normal rate, regular rhythm, normal S1, S2, no murmurs, rubs, clicks or gallops  Abdomen - soft, nontender, nondistended, no masses or organomegaly  Obese: No; Protuberant: No   Neurological - alert, oriented, normal speech, no focal findings or movement disorder noted  Extremities - peripheral pulses normal, no pedal edema, no clubbing or cyanosis  Skin - normal coloration and turgor, no rashes, no suspicious skin lesions noted  Numerous tattoos        Labs can be found in the Lab tab of Chart Review    Diet:  ADULT DIET;  Regular; Safety Tray; Safety Tray (Disposables)    Activity:  Activity as tolerated (Patient may move about with assist as indicated or with supervision.)    Follow-up:  in the next few weeks with his therapist    Disposition: transfer 4E    Condition: Stable      Time Spent: 25 minutes    Electronically signed by Migue Rasheed MD on 5/10/2022 at 11:54 AM    Discharging Hospitalist

## 2022-05-10 NOTE — PROGRESS NOTES
Behavioral Health   Admission Note     Admission Type:   Admission Type: Involuntary    Reason for admission:  Reason for Admission: Patient tried to hang self at his work and took 8-10 Wellbutrin, was on 5-K then discharged and readmited to 4E    PATIENT STRENGTHS:       Patient Strengths and Limitations:       Addictive Behavior:   Addictive Behavior  In the Past 3 Months, Have You Felt or Has Someone Told You That You Have a Problem With  : None    Medical Problems:   Past Medical History:   Diagnosis Date    ADHD (attention deficit hyperactivity disorder)        Status EXAM:  Mental Status and Behavioral Exam  Normal: Yes  Level of Assistance: Independent/Self  Facial Expression: Brightened  Affect: Appropriate  Level of Consciousness: Alert  Frequency of Checks: 4 times per hour, close  Mood:Normal: Yes  Motor Activity:Normal: Yes  Eye Contact: Good  Observed Behavior: Cooperative,Friendly  Sexual Misconduct History: Current - no  Preception: [place] to person,[place] to time,[place] to place,[place] to situation  Attention:Normal: Yes  Thought Processes: Circumstantial  Thought Content:Normal: Yes  Depression Symptoms: No problems reported or observed. Anxiety Symptoms: Generalized  Ashley Symptoms: No problems reported or observed. Hallucinations: None  Delusions: No  Memory:Normal: Yes  Insight and Judgment: Yes    Pt admitted with followings belongings:  Dental Appliances: None  Vision - Corrective Lenses: None  Hearing Aid: None  Jewelry: None  Body Piercings Removed: N/A  Clothing: Footwear,Jacket/Coat,Pants,Shirt (jacket, sweatshirt, kaleigh shirt, jeans, belt, red tennis shoes)  Other Valuables: Wallet,Cigarettes,Lighter/Matches,Money (walletwith $1, cards, 1 cigarette, vap pen, lighter)     Admission order obtained Yes  Belongings sent home with. Valuables placed in safe in security envelope, number:  . Patient's home medications were .   Patient oriented to surroundings and program expectations and copy of patient rights given. Received admission packet:  Yes  Consents reviewed, signed Yes. Outcomes Questionnaire completed Yes. \"An Important Message from [employer] About Your Rights\" form reviewed, signed: N/A . Patient verbalize understanding: Yes. Patient education on precautions: YES/NO/NA: yes          Patient screened positive for suicide risk on CSSR-S (\"yes\" to question #4, 5, OR 6)  . Physician notified of risk score. Orders received yes . 2 person skin assessment completed upon admission yes. Explained patients right to have family, representative or physician notified of their admission. Patient has Declined for physician to be notified. Patient has Declined for family/representative to be notified. Provided pt with [email or web address] Online handout entitled \"Quitting Smoking. \"  Reviewed handout with pt addressing dangers of smoking, developing coping skills, and providing basic information about quitting. Pt response to counseling:  Refuses to quit    Admission summary: Patient was admitted to [facility] for attempting to hang self at his work and took 8-10 Wellbutrin then after was discharged and readmitted to [facility]. Patient was pleasant and cooperative with the admission.   Good eye, clear speech, dressed in hospital mitchell Macias LPN

## 2022-05-11 PROBLEM — R45.851 DEPRESSION WITH SUICIDAL IDEATION: Status: ACTIVE | Noted: 2022-05-10

## 2022-05-11 PROBLEM — F32.A DEPRESSION WITH SUICIDAL IDEATION: Status: ACTIVE | Noted: 2022-05-10

## 2022-05-11 PROCEDURE — 1240000000 HC EMOTIONAL WELLNESS R&B

## 2022-05-11 PROCEDURE — APPSS30 APP SPLIT SHARED TIME 16-30 MINUTES: Performed by: PHYSICIAN ASSISTANT

## 2022-05-11 PROCEDURE — 6370000000 HC RX 637 (ALT 250 FOR IP): Performed by: PSYCHIATRY & NEUROLOGY

## 2022-05-11 PROCEDURE — 99222 1ST HOSP IP/OBS MODERATE 55: CPT | Performed by: PSYCHIATRY & NEUROLOGY

## 2022-05-11 PROCEDURE — 6370000000 HC RX 637 (ALT 250 FOR IP): Performed by: PHYSICIAN ASSISTANT

## 2022-05-11 PROCEDURE — G0378 HOSPITAL OBSERVATION PER HR: HCPCS

## 2022-05-11 PROCEDURE — 6370000000 HC RX 637 (ALT 250 FOR IP): Performed by: INTERNAL MEDICINE

## 2022-05-11 RX ORDER — SERTRALINE HYDROCHLORIDE 25 MG/1
25 TABLET, FILM COATED ORAL DAILY
Status: DISCONTINUED | OUTPATIENT
Start: 2022-05-11 | End: 2022-05-12

## 2022-05-11 RX ADMIN — MAGNESIUM HYDROXIDE 30 ML: 2400 SUSPENSION ORAL at 20:50

## 2022-05-11 RX ADMIN — SERTRALINE 25 MG: 25 TABLET, FILM COATED ORAL at 12:08

## 2022-05-11 RX ADMIN — TRAZODONE HYDROCHLORIDE 50 MG: 50 TABLET ORAL at 20:50

## 2022-05-11 ASSESSMENT — PAIN SCALES - GENERAL: PAINLEVEL_OUTOF10: 0

## 2022-05-11 NOTE — PLAN OF CARE
Problem: Self Harm/Suicidality  Goal: Will have no self-injury during hospital stay  Description: INTERVENTIONS:  1. Q 30 MINUTES: Routine safety checks  2. Q SHIFT & PRN: Assess risk to determine if routine checks are adequate to maintain patient safety  Outcome: Progressing  Note: Patient denies thoughts of self harm so far this shift. Problem: Depression/Self Harm  Goal: Effect of psychiatric condition will be minimized and patient will be protected from self harm  Description: INTERVENTIONS:  1. Assess impact of patient's symptoms on level of functioning, self care needs and offer support as indicated  2. Assess patient/family knowledge of depression, impact on illness and need for teaching  3. Provide emotional support, presence and reassurance  4. Assess for possible suicidal thoughts or ideation. If patient expresses suicidal thoughts or statements do not leave alone, initiate Suicide Precautions, move to a room close to the nursing station and obtain sitter  5. Initiate consults as appropriate with Mental Health Professional, Spiritual Care, Psychosocial CNS, and consider a recommendation to the LIP for a Psychiatric Consultation  Outcome: Progressing  Flowsheets  Taken 5/11/2022 1107  Effect of psychiatric condition will be minimized and patient will be protected from self harm: Assess impact of patients symptoms on level of functioning, self care needs and offer support as indicated  Taken 5/11/2022 1103  Effect of psychiatric condition will be minimized and patient will be protected from self harm: Assess impact of patients symptoms on level of functioning, self care needs and offer support as indicated  Note: Patient denies thoughts of self harm at this time. Problem: Involuntary Admit  Goal: Will cooperate with staff recommendations and doctor's orders and will demonstrate appropriate behavior  Description: INTERVENTIONS:  1. Treat underlying conditions and offer medication as ordered  2. Educate regarding involuntary admission procedures and rules  3. Contain excessive/inappropriate behavior per unit and hospital policies  Outcome: Progressing  Flowsheets  Taken 5/11/2022 1107  Will cooperate with staff recommendations and doctor's orders and will demonstrate appropriate behavior: Treat underlying conditions and offer medication as ordered  Taken 5/11/2022 1103  Will cooperate with staff recommendations and doctor's orders and will demonstrate appropriate behavior: Treat underlying conditions and offer medication as ordered  Note: Patient calm and cooperative with staff. Problem: Discharge Planning  Goal: Discharge to home or other facility with appropriate resources  Outcome: Progressing  Flowsheets  Taken 5/11/2022 1107  Discharge to home or other facility with appropriate resources: Identify barriers to discharge with patient and caregiver  Taken 5/11/2022 1103  Discharge to home or other facility with appropriate resources: Identify barriers to discharge with patient and caregiver  Note: Discharge planning ongoing at this time. Problem: Anxiety  Goal: Will report anxiety at manageable levels  Description: INTERVENTIONS:  1. Administer medication as ordered  2. Teach and rehearse alternative coping skills  3. Provide emotional support with 1:1 interaction with staff  Outcome: Progressing  Flowsheets  Taken 5/11/2022 1107  Will report anxiety at manageable levels: Administer medication as ordered  Taken 5/11/2022 1103  Will report anxiety at manageable levels: Administer medication as ordered  Note: Patient denies anxiety so far this shift. Care plan reviewed with patient.   Patient does verbalize understanding of the plan of care and does contribute to goal setting

## 2022-05-11 NOTE — PROGRESS NOTES
[id number] [address]  Initial Interdisciplinary Treatment Plan NOTE    Review Date & Time: 05/11/22 0905    Patient was in treatment team.  See Multidisciplinary Treatment Team sheet for participants. Admission Type:   Admission Type: Involuntary    Reason for admission:  Reason for Admission: Patient tried to hang self at his work and took 8-10 Wellbutrin, was on 5-K then discharged and readmited to 4E      Estimated Length of Stay Update:  05/12/22  Estimated Discharge Date Update: 3-5 days    PATIENT STRENGTHS:  Patient Strengths    Patient Strengths and Limitations:   Addictive Behavior:Addictive Behavior  In the Past 3 Months, Have You Felt or Has Someone Told You That You Have a Problem With  : None  Medical Problems:  Past Medical History:   Diagnosis Date    ADHD (attention deficit hyperactivity disorder)        EDUCATION:   Learner Progress Toward Treatment Goals: Reviewed results and recommendations of this team, Reviewed group plan and strategies, Reviewed signs, symptoms and risk of self harm and violent behavior and Reviewed goals and plan of care    Method: Individual    Outcome: Verbalized understanding and Demonstrated Understanding    PATIENT GOALS: Stabilize pt psychiatric symptoms. PLAN/TREATMENT RECOMMENDATIONS UPDATE:   1. What is the most important thing we can help you with while you are here? Regroup, get my head on again. 2. Who is your support system? Mother, friends  3. Do you have follow-up providers? Alec Crawford [phone]  4. Do you have the ability to pay for your medications? Medicaid  5. Where will you be residing when you leave the hospital? Mother's home  6. Will need a return to work slip or FMLA paper completion? Yes.  Employed at [employer] 5:   Time frame for Short-Term Goals: Daily    NAZIA Campbell

## 2022-05-11 NOTE — PROGRESS NOTES
Behavioral Services  Medicare Certification Upon Admission    I certify that this patient's inpatient psychiatric hospital admission is medically necessary for:    [x] (1) Treatment which could reasonably be expected to improve this patient's condition,       [x] (2) Or for diagnostic study;     AND     [x](2) The inpatient psychiatric services are provided while the individual is under the care of a physician and are included in the individualized plan of care.     Estimated length of stay/service 3-5 days    Plan for post-hospital care hc    Electronically signed by Magi Greenberg MD on 5/11/2022 at 9:30 AM

## 2022-05-11 NOTE — PROGRESS NOTES
Psychosocial Assessment    Current Level of Psychosocial Functioning     Independent       XXX  Dependent    Minimal Assist     Comments:      Psychosocial High Risk Factors (check all that apply)    Unable to obtain meds   Chronic illness/pain    Substance abuse      XXX  Lack of Family Support     XXX Limited  Financial stress   Isolation   Inadequate Community Resources  Suicide attempt(s)     XXX  Not taking medications   Victim of crime   Developmental Delay  Unable to manage personal needs    Age 72 or older   Homeless  No transportation   Readmission within 30 days  Unemployment  Traumatic Event  Relationship      XXX    Family/Supports identified: Mother, has a couple of friends. Sexual Orientation:      Heterosexual    Patient Strengths:     Support available, seeking help, employed, housing available. Patient Barriers:      Low distress tolerance, Lacks insight into his marijuana use as a part of his addiction and mental health issues. Safety plan:      Contracts for safety    CMHC/ history:     None. Current addiction primary care    Plan of Care:  medication management, group/individual therapies, family meetings, psycho -education, treatment team meetings to assist with stabilization    Initial Discharge Plan:  [facility]    Clinical Summary:  Farrukh Rubio is a 24year old male, who is admitted following a failed suicide attempt. Of not, Pt has 2 sisters who had completed suicide in the past. Pt describes conflicts with his ex-girlfriend who he works with at [email or web address] as well as the challenges of working on his recovery from his addiction. Pt is 3 months clean from cocaine and methamphetamine, however he continues to smoke 3 to 4 blunts daily. Onset of marijuana was age 14 and stated that he has always smoked \"top shelf\" marijuana.  Cocaine began at age 12 and he was snorting 2, 8 balls daily and methamphetamine daily beginning 6
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
## 2022-05-11 NOTE — BH NOTE
PLAN OF CARE:     Start Time: 0900  End Time:  0920    Group Topic:  Daily Goals    Group Type:   Goal Group    Intervention/Goal:  To increase support and identify daily goals    Attendance:  attended    Affect:  bright    Behavior:  Pleasant and cooperative     Response:  appropriate    Daily Goal:   Get my head back on straight.      Progress:   Progressing to goal
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
## 2022-05-12 PROCEDURE — 99232 SBSQ HOSP IP/OBS MODERATE 35: CPT | Performed by: PSYCHIATRY & NEUROLOGY

## 2022-05-12 PROCEDURE — 6370000000 HC RX 637 (ALT 250 FOR IP): Performed by: PHYSICIAN ASSISTANT

## 2022-05-12 PROCEDURE — 1240000000 HC EMOTIONAL WELLNESS R&B

## 2022-05-12 PROCEDURE — G0378 HOSPITAL OBSERVATION PER HR: HCPCS

## 2022-05-12 PROCEDURE — APPSS30 APP SPLIT SHARED TIME 16-30 MINUTES: Performed by: PHYSICIAN ASSISTANT

## 2022-05-12 PROCEDURE — 90833 PSYTX W PT W E/M 30 MIN: CPT | Performed by: PSYCHIATRY & NEUROLOGY

## 2022-05-12 PROCEDURE — 6370000000 HC RX 637 (ALT 250 FOR IP): Performed by: INTERNAL MEDICINE

## 2022-05-12 RX ORDER — TRAZODONE HYDROCHLORIDE 50 MG/1
25 TABLET ORAL NIGHTLY PRN
Status: DISCONTINUED | OUTPATIENT
Start: 2022-05-12 | End: 2022-05-13 | Stop reason: HOSPADM

## 2022-05-12 RX ADMIN — TRAZODONE HYDROCHLORIDE 25 MG: 50 TABLET ORAL at 21:07

## 2022-05-12 RX ADMIN — SERTRALINE 25 MG: 25 TABLET, FILM COATED ORAL at 08:37

## 2022-05-12 ASSESSMENT — PAIN SCALES - GENERAL
PAINLEVEL_OUTOF10: 0
PAINLEVEL_OUTOF10: 0

## 2022-05-12 NOTE — PATIENT CARE CONFERENCE
[facility]  Day 3 Interdisciplinary Treatment Plan NOTE    Review Date & Time: 5/12/22 at [phone]    Patient was in treatment team    Admission Type:   Admission Type: Involuntary    Reason for admission:  Reason for Admission: Patient tried to hang self at his work and took 8-10 Wellbutrin, was on 5-K then discharged and readmited to 4E  Estimated Length of Stay Update:  1-2 days  Estimated Discharge Date Update: 5/13/2022    PATIENT STRENGTHS:  Patient Strengths    Patient Strengths and Limitations:   Addictive Behavior:Addictive Behavior  In the Past 3 Months, Have You Felt or Has Someone Told You That You Have a Problem With  : None  Medical Problems:  Past Medical History:   Diagnosis Date    ADHD (attention deficit hyperactivity disorder)        Risk:  Fall Risk   Mukund Scale Mukund Scale Score: 22    Status EXAM:   Mental Status and Behavioral Exam  Normal: Yes  Level of Assistance: Independent/Self  Facial Expression: Brightened  Affect: Appropriate  Level of Consciousness: Alert  Frequency of Checks: 4 times per hour, close  Mood:Normal: Yes  Motor Activity:Normal: Yes  Eye Contact: Good  Observed Behavior: Cooperative,Friendly  Sexual Misconduct History: Current - no  Preception: [place] to person,[place] to time,[place] to place,[place] to situation  Attention:Normal: Yes  Thought Processes: Circumstantial  Thought Content:Normal: Yes  Depression Symptoms: No problems reported or observed. Anxiety Symptoms: No problems reported or observed. Ashley Symptoms: No problems reported or observed.   Hallucinations: None  Delusions: No  Memory:Normal: Yes  Insight and Judgment: Yes    Daily Assessment Last Entry:   Daily Sleep (WDL): Within Defined Limits            Daily Nutrition (WDL): Within Defined Limits  Level of Assistance: Independent/Self    Patient Monitoring:  Frequency of Checks: 4 times per hour, close    Psychiatric Symptoms:   Depression Symptoms  Depression Symptoms: No problems reported or observed. Anxiety Symptoms  Anxiety Symptoms: No problems reported or observed. Ashley Symptoms  Ashley Symptoms: No problems reported or observed. Suicide Risk CSSR-S:  1) Within the past month, have you wished you were dead or wished you could go to sleep and not wake up? : Yes  2) Have you actually had any thoughts of killing yourself? : Yes  3) Have you been thinking about how you might kill yourself? : No  5) Have you started to work out or worked out the details of how to kill yourself? Do you intend to carry out this plan? : No  6) Have you ever done anything, started to do anything, or prepared to do anything to end your life?: Yes    EDUCATION:   Learner Progress Toward Treatment Goals: Reviewed results and recommendations of this team    Method: Individual    Outcome: Verbalized understanding    PATIENT GOALS: \"To have time to think about things. \"    PLAN/TREATMENT RECOMMENDATIONS UPDATE:  1. How are you progressing toward meeting your main treatment goal?  Pt states he is feeling better today than when he came into 4E. He verbalized employment goals and stated he feels supported by his employer. 2.  Are there discharge barriers/lingering problems that need to be addressed? Denies        3. Do you have the ability to pay for your medications? Has medicaid      4. How is your group participation?   Pt attends with peers    GOALS UPDATE:   Time frame for Short-Term Goals: 1-2 days      Aris Domínguez RN

## 2022-05-12 NOTE — PLAN OF CARE
Problem: Self Harm/Suicidality  Goal: Will have no self-injury during hospital stay  Description: INTERVENTIONS:  1. Q 30 MINUTES: Routine safety checks  2. Q SHIFT & PRN: Assess risk to determine if routine checks are adequate to maintain patient safety  5/12/2022 1313 by Jose A Gillis RN  Outcome: Progressing  5/11/2022 2318 by Carlos Hernandez RN  Outcome: Progressing  Note: No self harm noted so far this shift. Problem: Depression/Self Harm  Goal: Effect of psychiatric condition will be minimized and patient will be protected from self harm  Description: INTERVENTIONS:  1. Assess impact of patient's symptoms on level of functioning, self care needs and offer support as indicated  2. Assess patient/family knowledge of depression, impact on illness and need for teaching  3. Provide emotional support, presence and reassurance  4. Assess for possible suicidal thoughts or ideation. If patient expresses suicidal thoughts or statements do not leave alone, initiate Suicide Precautions, move to a room close to the nursing station and obtain sitter  5.  Initiate consults as appropriate with Mental Health Professional, Spiritual Care, Psychosocial CNS, and consider a recommendation to the [facility]IP for a Psychiatric Consultation  5/12/2022 1313 by Jose A Gillis RN  Outcome: Progressing  Flowsheets (Taken 5/12/2022 0800)  Effect of psychiatric condition will be minimized and patient will be protected from self harm: Assess impact of patients symptoms on level of functioning, self care needs and offer support as indicated  5/11/2022 2318 by Carlos Hernandez RN  Outcome: Progressing  Flowsheets  Taken 5/11/2022 2318  Effect of psychiatric condition will be minimized and patient will be protected from self harm: Assess impact of patients symptoms on level of functioning, self care needs and offer support as indicated  Taken 5/11/2022 2317  Effect of psychiatric condition will be minimized and patient will be protected from self harm: Assess impact of patients symptoms on level of functioning, self care needs and offer support as indicated  Taken 5/11/2022 2310  Effect of psychiatric condition will be minimized and patient will be protected from self harm: Assess impact of patients symptoms on level of functioning, self care needs and offer support as indicated  Note: Patient denies any feelings of depression at present. Patient noted with a bright affect. Problem: Involuntary Admit  Goal: Will cooperate with staff recommendations and doctor's orders and will demonstrate appropriate behavior  Description: INTERVENTIONS:  1. Treat underlying conditions and offer medication as ordered  2. Educate regarding involuntary admission procedures and rules  3. Contain excessive/inappropriate behavior per unit and hospital policies  7/99/3135 2247 by Jacinto Gonzales RN  Outcome: Progressing  Flowsheets (Taken 5/12/2022 0800)  Will cooperate with staff recommendations and doctor's orders and will demonstrate appropriate behavior: Treat underlying conditions and offer medication as ordered  5/11/2022 2318 by Evelio Layne RN  Outcome: Progressing  Flowsheets  Taken 5/11/2022 2318  Will cooperate with staff recommendations and doctor's orders and will demonstrate appropriate behavior: Treat underlying conditions and offer medication as ordered  Taken 5/11/2022 2310  Will cooperate with staff recommendations and doctor's orders and will demonstrate appropriate behavior: Treat underlying conditions and offer medication as ordered  Note: Patient is cooperative with all aspects of his care.      Problem: Discharge Planning  Goal: Discharge to home or other facility with appropriate resources  5/12/2022 1313 by Jacinto Gonzales RN  Outcome: Progressing  Flowsheets (Taken 5/12/2022 0800)  Discharge to home or other facility with appropriate resources: Identify barriers to discharge with patient and caregiver  Note: Patient continues to work with care team toward discharge goal. Not discharged this shift. 5/11/2022 2318 by Evelio Layne RN  Outcome: Progressing  Flowsheets (Taken 5/11/2022 2310)  Discharge to home or other facility with appropriate resources: Identify barriers to discharge with patient and caregiver  Note: Patient states he plans to return home with his mother at discharge and continue to follow with [facility]. Problem: Anxiety  Goal: Will report anxiety at manageable levels  Description: INTERVENTIONS:  1. Administer medication as ordered  2. Teach and rehearse alternative coping skills  3. Provide emotional support with 1:1 interaction with staff  5/12/2022 1313 by Jacinto Gonzales RN  Outcome: Progressing  Flowsheets (Taken 5/12/2022 0800)  Will report anxiety at manageable levels: Administer medication as ordered  5/11/2022 2318 by Evelio Layne RN  Outcome: Progressing  Flowsheets  Taken 5/11/2022 2318  Will report anxiety at manageable levels: Administer medication as ordered  Taken 5/11/2022 2310  Will report anxiety at manageable levels: Administer medication as ordered  Note: Patient denies any anxiety so far this shift.

## 2022-05-12 NOTE — FLOWSHEET NOTE
Spiritual Support Group Note    Number of Participants in Group: 6                              Time: 1430    Goal: Relief from isolation and loneliness             Latoya Sharing             Self-understanding and gain insight              Acceptance and belonging            Recognize they are not alone                Socialization             Empowerment       Encouragement    Topic:  [x] Spiritual Wellness and Self Care                  [x] Hope                     [x] Connecting with Divine/Others        [x] Thankfulness and Gratitude               [x]  Meaningfulness and Purpose               [] Forgiveness               [] Peace               [] Connect to [facility]     [] Other:    Participation Level:   [x] Active Listener   [] Minimal   [] Monopolizing   [x] Interactive   [] No Participation   []  Other:     Attention:   [x] Alert   [x] Distractible   [] Drowsy   [] Poor   [] Other:    Manner:   [x] Cooperative   [] Suspicious   [] Withdrawn   [] Guarded   [] Irritable   [] Inhospitable   [] Other:     Others Comments from Group: [employer] participated in the spirituality group. He loves music and shared where he would like to go on vacation.

## 2022-05-12 NOTE — GROUP NOTE
Group Therapy Note    Date: 5/12/2022    Group Start Time: 1600  Group End Time: 36  Group Topic: Healthy Living/Wellness    [facility] 4E    Zechariah Dorman RN        Group Therapy Note  1:1 Safety Plan review with Nurse. Notes:  1:1 Safety Plan Review with Nurse    Status After Intervention:  Improved    Participation Level:  Active Listener    Participation Quality: Appropriate      Speech:  normal      Thought Process/Content: Logical      Affective Functioning: Congruent      Mood: euthymic      Level of consciousness:  Oriented x4      Response to Learning: Able to verbalize current knowledge/experience      Endings: None Reported    Modes of Intervention: Education      Discipline Responsible: Registered Nurse      Signature:  Zechariah Dorman RN

## 2022-05-12 NOTE — PLAN OF CARE
harm: Assess impact of patients symptoms on level of functioning, self care needs and offer support as indicated  Note: Patient denies any feelings of depression at present. Patient noted with a bright affect. 5/11/2022 1107 by Facundo Ortiz RN  Outcome: Progressing  Flowsheets  Taken 5/11/2022 1107  Effect of psychiatric condition will be minimized and patient will be protected from self harm: Assess impact of patients symptoms on level of functioning, self care needs and offer support as indicated  Taken 5/11/2022 1103  Effect of psychiatric condition will be minimized and patient will be protected from self harm: Assess impact of patients symptoms on level of functioning, self care needs and offer support as indicated  Note: Patient denies thoughts of self harm at this time. Problem: Involuntary Admit  Goal: Will cooperate with staff recommendations and doctor's orders and will demonstrate appropriate behavior  Description: INTERVENTIONS:  1. Treat underlying conditions and offer medication as ordered  2. Educate regarding involuntary admission procedures and rules  3. Contain excessive/inappropriate behavior per unit and hospital policies  4/73/0590 2352 by Juan A Marsh RN  Outcome: Progressing  Flowsheets  Taken 5/11/2022 2318  Will cooperate with staff recommendations and doctor's orders and will demonstrate appropriate behavior: Treat underlying conditions and offer medication as ordered  Taken 5/11/2022 2310  Will cooperate with staff recommendations and doctor's orders and will demonstrate appropriate behavior: Treat underlying conditions and offer medication as ordered  Note: Patient is cooperative with all aspects of his care.   5/11/2022 1107 by Facundo Ortiz RN  Outcome: Progressing  Flowsheets  Taken 5/11/2022 1107  Will cooperate with staff recommendations and doctor's orders and will demonstrate appropriate behavior: Treat underlying conditions and offer medication as ordered  Taken 5/11/2022 1103  Will cooperate with staff recommendations and doctor's orders and will demonstrate appropriate behavior: Treat underlying conditions and offer medication as ordered  Note: Patient calm and cooperative with staff. Problem: Discharge Planning  Goal: Discharge to home or other facility with appropriate resources  5/11/2022 2318 by Juana Cruz RN  Outcome: Progressing  Flowsheets (Taken 5/11/2022 2310)  Discharge to home or other facility with appropriate resources: Identify barriers to discharge with patient and caregiver  Note: Patient states he plans to return home with his mother at discharge and continue to follow with [facility]. 5/11/2022 1107 by Samantha Galarza RN  Outcome: Progressing  Flowsheets  Taken 5/11/2022 1107  Discharge to home or other facility with appropriate resources: Identify barriers to discharge with patient and caregiver  Taken 5/11/2022 1103  Discharge to home or other facility with appropriate resources: Identify barriers to discharge with patient and caregiver  Note: Discharge planning ongoing at this time. Problem: Anxiety  Goal: Will report anxiety at manageable levels  Description: INTERVENTIONS:  1. Administer medication as ordered  2. Teach and rehearse alternative coping skills  3. Provide emotional support with 1:1 interaction with staff  5/11/2022 2318 by Juana Cruz RN  Outcome: Progressing  Flowsheets  Taken 5/11/2022 2318  Will report anxiety at manageable levels: Administer medication as ordered  Taken 5/11/2022 2310  Will report anxiety at manageable levels: Administer medication as ordered  Note: Patient denies any anxiety so far this shift.   5/11/2022 1107 by Samantha Galarza RN  Outcome: Progressing  Flowsheets  Taken 5/11/2022 1107  Will report anxiety at manageable levels: Administer medication as ordered  Taken 5/11/2022 1103  Will report anxiety at manageable levels: Administer medication as ordered  Note: Patient denies anxiety so far this shift. Care plan reviewed with patient.   Patient does verbalize understanding of the plan of care and does contribute to goal setting

## 2022-05-12 NOTE — BH NOTE
Group Therapy Note    Date: 5/11/2022  Start Time: 2000  End Time:  2020  Number of Participants: 1    Type of Group: Wrap-Up/Relaxation    Wellness Binder Information  Module Name:  None  Session Number:  None    Patient's Goal:  To get through the day    Notes:  Met    Status After Intervention:  Improved    Participation Level: Interactive    Participation Quality: Appropriate      Speech:  normal      Thought Process/Content: Logical      Affective Functioning: Congruent      Mood: elevated      Level of consciousness:  Oriented x4      Response to Learning: Capable of insight      Endings: None Reported    Modes of Intervention: Education      Discipline Responsible: Registered Nurse      Signature:   Radha Nichols RN

## 2022-05-12 NOTE — PROGRESS NOTES
Department of Psychiatry  Progress Note     Chief Complaint:  Suicide attempt by hanging self and overdose of Wellbutrin     PROGRESS:  Melinda Peter is seen out on the unit. He agrees to speak with this writer in the interview room. He is cooperative and pleasant during the interview. Melinda Peter reports he is doing good today. Mood is great. He rates his mood 10 out of 10 with 10 being the best.  His depression has improved since admission. He denies any active suicidal ideation during the interview. He contracts for safety on the unit. He is feeling less hopeless and helpless about his situation. He has been talking with his family on the phone who have been very supportive. He talked to his mom, younger brothers and his stepfather yesterday. His best friend also visited him yesterday. He reports he slept okay last night. Staff reported he slept 7.5 hours broken. His appetite has been good. He does report he had some stomach pain and constipation which he attributes to the hospital food. He says he took [id number] last night and has had multiple bowel movement since. His stomach still feels a little \"bleh\" this morning. He has been compliant with his medications and denies any side effects. He has been bright and interacting well with peers on the unit. He has also been attending groups.     Suicidal ideations: denies active suicidal ideation  Compliance with medications: good   Medication side effects: absent  ROS: Patient has new complaints: Stomach discomfort, constipation  Sleep quality: 7.5 hours broken last night per staff  Attending groups: yes      OBJECTIVE      Medications  Current Facility-Administered Medications: traZODone (DESYREL) tablet 25 mg, 25 mg, Oral, Nightly PRN  [START ON 5/13/2022] sertraline (ZOLOFT) tablet 50 mg, 50 mg, Oral, Daily  acetaminophen (TYLENOL) tablet 650 mg, 650 mg, Oral, Q4H PRN  ibuprofen (ADVIL;MOTRIN) tablet 400 mg, 400 mg, Oral, Q6H PRN  magnesium hydroxide (MILK OF MAGNESIA) 400 MG/5ML suspension 30 mL, 30 mL, Oral, Daily PRN  aluminum & magnesium hydroxide-simethicone (MAALOX) 200-200-20 MG/5ML suspension 30 mL, 30 mL, Oral, Q6H PRN  hydrOXYzine (ATARAX) tablet 50 mg, 50 mg, Oral, TID PRN  albuterol sulfate  (90 Base) MCG/ACT inhaler 2 puff, 2 puff, Inhalation, Q4H PRN  nicotine (NICODERM CQ) 21 MG/24HR 1 patch, 1 patch, TransDERmal, Daily     Physical     height is 5' 7\" (1.702 m) and weight is 119 lb (54 kg). His oral temperature is 98.1 °F (36.7 °C). His blood pressure is 123/94 (abnormal) and his pulse is 85. His respiration is 16 and oxygen saturation is 100%.    Lab Results   Component Value Date    WBC 6.5 05/09/2022    HGB 13.6 (L) 05/09/2022    HCT 42.1 05/09/2022     05/09/2022    ALT 11 05/09/2022    AST 21 05/09/2022     05/09/2022    K 3.8 05/09/2022     05/09/2022    CREATININE 0.6 05/09/2022    BUN 8 05/09/2022    CO2 18 (L) 05/09/2022          Mental Status Exam:   Level of consciousness:  Within normal limits  Appearance: hospital attire, seated in chair, fair grooming  Behavior/Motor: Pleasant, calm  Attitude toward examiner:  cooperative, attentive and good eye contact  Speech:  Spontaneous, normal rate and volume  Mood:  \"Great\" per patient   Affect: Reactive  Thought processes:  Linear, goal directed, coherent  Thought content: Denies active suicidal ideation at this time              denies homicidal ideations               denies hallucinations              denies delusions  Cognition:  Oriented to self, situation, location, date  Concentration clinically adequate  Memory age appropriate  Insight & Judgment:  fair        ASSESSMENT     Depression with suicidal ideation   Cannabis use disorder  Stimulant use disorder, severe, dependence, in early remission   Rule out substance induced mood disorder, PTSD   ADHD per history     PLAN    Patient's symptoms show some improvement today  Medication adjustments as discussed 5/12/22 at 6:09 PM EDT

## 2022-05-13 VITALS
HEIGHT: 67 IN | SYSTOLIC BLOOD PRESSURE: 121 MMHG | OXYGEN SATURATION: 100 % | RESPIRATION RATE: 16 BRPM | BODY MASS INDEX: 18.68 KG/M2 | WEIGHT: 119 LBS | HEART RATE: 78 BPM | DIASTOLIC BLOOD PRESSURE: 86 MMHG | TEMPERATURE: 98.1 F

## 2022-05-13 PROCEDURE — 6370000000 HC RX 637 (ALT 250 FOR IP): Performed by: PSYCHIATRY & NEUROLOGY

## 2022-05-13 PROCEDURE — G0378 HOSPITAL OBSERVATION PER HR: HCPCS

## 2022-05-13 PROCEDURE — 5130000000 HC BRIDGE APPOINTMENT

## 2022-05-13 PROCEDURE — 6370000000 HC RX 637 (ALT 250 FOR IP): Performed by: PHYSICIAN ASSISTANT

## 2022-05-13 PROCEDURE — 99238 HOSP IP/OBS DSCHRG MGMT 30/<: CPT | Performed by: PSYCHIATRY & NEUROLOGY

## 2022-05-13 PROCEDURE — 6370000000 HC RX 637 (ALT 250 FOR IP): Performed by: INTERNAL MEDICINE

## 2022-05-13 RX ORDER — TRAZODONE HYDROCHLORIDE 50 MG/1
25 TABLET ORAL NIGHTLY PRN
Qty: 30 TABLET | Refills: 0 | Status: SHIPPED | OUTPATIENT
Start: 2022-05-13

## 2022-05-13 RX ADMIN — SERTRALINE 50 MG: 50 TABLET, FILM COATED ORAL at 08:38

## 2022-05-13 RX ADMIN — ACETAMINOPHEN 650 MG: 325 TABLET ORAL at 08:38

## 2022-05-13 ASSESSMENT — PAIN DESCRIPTION - LOCATION
LOCATION: HEAD
LOCATION: HEAD

## 2022-05-13 ASSESSMENT — PAIN DESCRIPTION - DESCRIPTORS: DESCRIPTORS: ACHING

## 2022-05-13 ASSESSMENT — PAIN DESCRIPTION - PAIN TYPE: TYPE: ACUTE PAIN

## 2022-05-13 ASSESSMENT — PAIN - FUNCTIONAL ASSESSMENT: PAIN_FUNCTIONAL_ASSESSMENT: ACTIVITIES ARE NOT PREVENTED

## 2022-05-13 ASSESSMENT — PAIN SCALES - GENERAL
PAINLEVEL_OUTOF10: 1
PAINLEVEL_OUTOF10: 2
PAINLEVEL_OUTOF10: 1

## 2022-05-13 ASSESSMENT — PAIN SCALES - WONG BAKER
WONGBAKER_NUMERICALRESPONSE: 0
WONGBAKER_NUMERICALRESPONSE: 0

## 2022-05-13 ASSESSMENT — PAIN DESCRIPTION - FREQUENCY: FREQUENCY: INTERMITTENT

## 2022-05-13 NOTE — DISCHARGE INSTR - DIET

## 2022-05-13 NOTE — PROGRESS NOTES
This RN has reviewed and agrees with Gaye Lim LPN's data collection and has collaborated with this LPN regarding the patient's care plan.

## 2022-05-13 NOTE — PLAN OF CARE
Problem: Self Harm/Suicidality  Goal: Will have no self-injury during hospital stay  Description: INTERVENTIONS:  1. Q 30 MINUTES: Routine safety checks  2. Q SHIFT & PRN: Assess risk to determine if routine checks are adequate to maintain patient safety  5/12/2022 2244 by Keri Cardona LPN  Outcome: Progressing  Note: Patient is free from self harm at this point in the shift and staff is completing 15 minute safety checks   5/12/2022 1313 by Akanksha Lopez RN  Outcome: Progressing     Problem: Depression/Self Harm  Goal: Effect of psychiatric condition will be minimized and patient will be protected from self harm  Description: INTERVENTIONS:  1. Assess impact of patient's symptoms on level of functioning, self care needs and offer support as indicated  2. Assess patient/family knowledge of depression, impact on illness and need for teaching  3. Provide emotional support, presence and reassurance  4. Assess for possible suicidal thoughts or ideation. If patient expresses suicidal thoughts or statements do not leave alone, initiate Suicide Precautions, move to a room close to the nursing station and obtain sitter  5.  Initiate consults as appropriate with Mental Health Professional, Spiritual Care, Psychosocial CNS, and consider a recommendation to the LIP for a Psychiatric Consultation  5/12/2022 2244 by Keri Cardona LPN  Outcome: Progressing  Flowsheets (Taken 5/12/2022 1930)  Effect of psychiatric condition will be minimized and patient will be protected from self harm: Assess impact of patients symptoms on level of functioning, self care needs and offer support as indicated  Note: Patient is free from self harm at this point in the shift   5/12/2022 1313 by Akanksha Lopez RN  Outcome: Progressing  Flowsheets (Taken 5/12/2022 0800)  Effect of psychiatric condition will be minimized and patient will be protected from self harm: Assess impact of patients symptoms on level of functioning, self care needs and offer support as indicated     Problem: Involuntary Admit  Goal: Will cooperate with staff recommendations and doctor's orders and will demonstrate appropriate behavior  Description: INTERVENTIONS:  1. Treat underlying conditions and offer medication as ordered  2. Educate regarding involuntary admission procedures and rules  3. Contain excessive/inappropriate behavior per unit and hospital policies  3/65/1570 3344 by Cedric Alaniz LPN  Outcome: Progressing  Flowsheets (Taken 5/12/2022 1930)  Will cooperate with staff recommendations and doctor's orders and will demonstrate appropriate behavior: Treat underlying conditions and offer medication as ordered  Note: Ongoing   5/12/2022 1313 by Ijeoma Serrano RN  Outcome: Progressing  Flowsheets (Taken 5/12/2022 0800)  Will cooperate with staff recommendations and doctor's orders and will demonstrate appropriate behavior: Treat underlying conditions and offer medication as ordered     Problem: Discharge Planning  Goal: Discharge to home or other facility with appropriate resources  5/12/2022 2244 by Cedric Alaniz LPN  Outcome: Progressing  Flowsheets (Taken 5/12/2022 1930)  Discharge to home or other facility with appropriate resources: Identify barriers to discharge with patient and caregiver  Note: Patient expected discharge is tomorrow and will be discharged home with mother   5/12/2022 1313 by Ijeoma Serrano RN  Outcome: Progressing  Flowsheets (Taken 5/12/2022 0800)  Discharge to home or other facility with appropriate resources: Identify barriers to discharge with patient and caregiver  Note: Patient continues to work with care team toward discharge goal. Not discharged this shift. Problem: Anxiety  Goal: Will report anxiety at manageable levels  Description: INTERVENTIONS:  1. Administer medication as ordered  2. Teach and rehearse alternative coping skills  3.  Provide emotional support with 1:1 interaction with staff  5/12/2022 2244 by Cedric Alaniz LPN  Outcome: Progressing  Flowsheets (Taken 5/12/2022 1930)  Will report anxiety at manageable levels: Administer medication as ordered  Note: Denies any anxiety   5/12/2022 1459 by Kermit Cristobal  Outcome: Progressing  5/12/2022 1313 by Jose A Gillis RN  Outcome: Progressing  Flowsheets (Taken 5/12/2022 0800)  Will report anxiety at manageable levels: Administer medication as ordered  Care plan reviewed with patient. Patient verbalize understanding of the plan of care and contribute to goal setting.

## 2022-05-13 NOTE — PROGRESS NOTES
Behavioral Health   Discharge Note    Pt discharged with followings belongings:   Dental Appliances: None  Vision - Corrective Lenses: None  Hearing Aid: None  Jewelry: None  Body Piercings Removed: N/A  Clothing: Belt,Footwear,Shirt,Pants,Jacket/Coat  Other Valuables: Money,Wallet,Lighter/Matches,Cigarettes   Valuables retrieved from safe, security envelope number and returned to patient. Patient left department discharged to home with mother. \"An Important Message from Medicare About Your Rights\" (IMM) form photocopy original from admission and provided to pt at least 4 hours prior to discharge N/A. If pt left within 4 hours of receiving 2nd delivery of IMM, this is because pt was agreeable with hospital discharge. Patient/guardian education on aftercare instructions: Yes  Bridge appointment completed:  yes. Reviewed Discharge Instructions with patient/family/nursing facility. Patient/family verbalizes understanding and agreement with the discharge plan using the teachback method. Patient/family verbalize understanding of AVS:Yes    Status EXAM upon discharge:  Mental Status and Behavioral Exam  Normal: Yes  Level of Assistance: Independent/Self  Facial Expression: Brightened  Affect: Appropriate  Level of Consciousness: Alert  Frequency of Checks: 4 times per hour, close  Mood:Normal: Yes  Motor Activity:Normal: Yes  Eye Contact: Good  Observed Behavior: Cooperative  Sexual Misconduct History: Current - no  Preception: Bryan to person,Bryan to time,Bryan to place,Bryan to situation  Attention:Normal: Yes  Thought Processes: Circumstantial  Thought Content:Normal: Yes  Depression Symptoms: No problems reported or observed. Anxiety Symptoms: No problems reported or observed. Ashley Symptoms: No problems reported or observed. Hallucinations: None  Delusions: No  Memory:Normal: Yes  Insight and Judgment: Yes    Rito Vera LPN   Patient was discharged on 5-13-22 1030.  Patient stated lived a block away and would be walking home to [facility] house. Patient left with work release/ discharge paperwork in hand. Denied any other needs.

## 2022-05-13 NOTE — PROGRESS NOTES
Group Therapy Note    Date: 5/12/2022  Start Time: 2000  End Time:  2030    Type of Group: Wrap-Up    Patient's Goal:  To go home tomorrow and see brother     Notes:  Goal met      Status After Intervention:  Improved    Participation Level:  Active Listener and Interactive    Participation Quality: Appropriate, Attentive and Sharing      Speech:  normal      Thought Process/Content: Logical      Affective Functioning: Congruent      Mood: content       Level of consciousness:  Alert x 4      Response to Learning: Able to verbalize current knowledge/experience and Able to verbalize/acknowledge new learning      Endings: None Reported    Modes of Intervention: Education, Support, Socialization and Exploration      Discipline Responsible: Licensed Practical Nurse      Signature:  Keri Cardona LPN
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
## 2022-05-14 LAB
HAV IGM SER IA-ACNC: NEGATIVE
HEPATITIS B CORE IGM ANTIBODY: NEGATIVE
HEPATITIS B SURFACE ANTIGEN: NEGATIVE
HEPATITIS C ANTIBODY: NEGATIVE

## 2022-05-16 ENCOUNTER — TELEPHONE (OUTPATIENT)
Dept: PSYCHIATRY | Age: 22
End: 2022-05-16

## 2022-05-19 NOTE — ED NOTES
Patient resting in bed. Respirations easy and unlabored. Denies further needs at this time. Sitter at bedside.        Marianna Worrell RN  05/08/22 [id number] 19-May-2022 02:29

## 2022-06-30 NOTE — ED TRIAGE NOTES
Presents to ER with complaints of upper abdominal pian that began upon waking up this morning. Pt denies any abdominal surgeries in the past. States the pain is constant but worsens upon lifting things. Infliximab Counseling:  I discussed with the patient the risks of infliximab including but not limited to myelosuppression, immunosuppression, autoimmune hepatitis, demyelinating diseases, lymphoma, and serious infections.  The patient understands that monitoring is required including a PPD at baseline and must alert us or the primary physician if symptoms of infection or other concerning signs are noted.

## 2022-08-08 ENCOUNTER — HOSPITAL ENCOUNTER (EMERGENCY)
Age: 22
Discharge: HOME | End: 2022-08-08
Attending: STUDENT IN AN ORGANIZED HEALTH CARE EDUCATION/TRAINING PROGRAM
Payer: COMMERCIAL

## 2022-08-08 ENCOUNTER — APPOINTMENT (OUTPATIENT)
Dept: GENERAL RADIOLOGY | Age: 22
End: 2022-08-08
Payer: COMMERCIAL

## 2022-08-08 VITALS
TEMPERATURE: 97.9 F | OXYGEN SATURATION: 98 % | SYSTOLIC BLOOD PRESSURE: 117 MMHG | HEART RATE: 80 BPM | DIASTOLIC BLOOD PRESSURE: 79 MMHG | RESPIRATION RATE: 18 BRPM

## 2022-08-08 DIAGNOSIS — S99.922A TOE INJURY, LEFT, INITIAL ENCOUNTER: Primary | ICD-10-CM

## 2022-08-08 PROCEDURE — 99283 EMERGENCY DEPT VISIT LOW MDM: CPT

## 2022-08-08 PROCEDURE — 73630 X-RAY EXAM OF FOOT: CPT

## 2022-08-08 ASSESSMENT — ENCOUNTER SYMPTOMS
PHOTOPHOBIA: 0
CHOKING: 0
ANAL BLEEDING: 0
SINUS PAIN: 0
EYE REDNESS: 0
SHORTNESS OF BREATH: 0
APNEA: 0
COUGH: 0
FACIAL SWELLING: 0
SINUS PRESSURE: 0
CHEST TIGHTNESS: 0
EYE PAIN: 0
ABDOMINAL PAIN: 0
EYE DISCHARGE: 0
BLOOD IN STOOL: 0
RECTAL PAIN: 0

## 2022-08-08 ASSESSMENT — PAIN DESCRIPTION - ORIENTATION: ORIENTATION: LEFT

## 2022-08-08 ASSESSMENT — PAIN SCALES - GENERAL: PAINLEVEL_OUTOF10: 7

## 2022-08-08 ASSESSMENT — PAIN - FUNCTIONAL ASSESSMENT: PAIN_FUNCTIONAL_ASSESSMENT: 0-10

## 2022-08-08 ASSESSMENT — PAIN DESCRIPTION - LOCATION: LOCATION: TOE (COMMENT WHICH ONE)

## 2022-08-08 NOTE — ED PROVIDER NOTES
[address] [address]          Pt Name: Maru Andrade  MRN: [id number]  [place] 2000  Date of evaluation: 8/8/2022  Treating Resident Physician: Twan Melgar MD  Supervising Physician: Leann Cole MD    CHIEF COMPLAINT       Chief Complaint   Patient presents with    Toe Pain     History obtained from the patient. HISTORY OF PRESENT ILLNESS    HPI  Maru Andrade is a 20 y.o. male with PMHx of depression with suicidal ideations who presents to the emergency department for evaluation of toe pain. Patient states that Saturday he was walking out side when he stubbed his left great toe on the curb/concrete. Pt states that since then, he has been having increased pain. Denies any numbness or tingling. States pain is 6/10 and throbbing in nature. The patient has no other acute complaints at this time. REVIEW OF SYSTEMS   Review of Systems   Constitutional:  Negative for activity change, chills, diaphoresis and fatigue. HENT:  Negative for congestion, ear discharge, ear pain, facial swelling, hearing loss, sinus pressure and sinus pain. Eyes:  Negative for photophobia, pain, discharge and redness. Respiratory:  Negative for apnea, cough, choking, chest tightness and shortness of breath. Cardiovascular:  Negative for chest pain and leg swelling. Gastrointestinal:  Negative for abdominal pain, anal bleeding, blood in stool and rectal pain. Endocrine: Negative for polydipsia, polyphagia and polyuria. Genitourinary:  Negative for dysuria, flank pain, genital sores and hematuria. Musculoskeletal:  Negative for gait problem, joint swelling, myalgias, neck pain and neck stiffness. Skin:  Negative for pallor, rash and wound. Neurological:  Negative for tremors, seizures, syncope, facial asymmetry and headaches. Hematological:  Negative for adenopathy.    Psychiatric/Behavioral:  Negative for agitation, behavioral problems, hallucinations, self-injury and suicidal ideas. PAST MEDICAL AND SURGICAL HISTORY     Past Medical History:   Diagnosis Date    ADHD (attention deficit hyperactivity disorder)      No past surgical history on file. MEDICATIONS   No current facility-administered medications for this encounter. Current Outpatient Medications:     sertraline (ZOLOFT) 50 MG tablet, Take 1 tablet by mouth daily, Disp: 30 tablet, Rfl: 0    traZODone (DESYREL) 50 MG tablet, Take 0.5 tablets by mouth nightly as needed for Sleep, Disp: 30 tablet, Rfl: 0    albuterol sulfate  (90 Base) MCG/ACT inhaler, Inhale 2 puffs into the lungs every 4 hours as needed for Wheezing, Disp: 1 Inhaler, Rfl: 0      SOCIAL HISTORY     Social History     Social History Narrative    Not on file     Social History     Tobacco Use    Smoking status: Every Day     Packs/day: 0.50     Years: 0.00     Pack years: 0.00     Types: Cigarettes    Smokeless tobacco: Never    Tobacco comments:     patient does not want to quit   Vaping Use    Vaping Use: Never used   Substance Use Topics    Alcohol use: Not Currently     Comment: rare    Drug use: Yes     Types: Marijuana (Weed)         ALLERGIES   No Known Allergies      FAMILY HISTORY   No family history on file. PREVIOUS RECORDS   Previous records reviewed: I reviewed the patient's past medical records including relevant labs, imaging and procedures. Patient last seen in the emergency department on 2/1/2022 due to viral pharyngitis    PHYSICAL EXAM     ED Triage Vitals [08/08/22 1253]   BP Temp Temp Source Heart Rate Resp SpO2 Height Weight   117/79 97.9 °F (36.6 °C) Oral 80 18 98 % -- --     Initial vital signs and nursing assessment reviewed and normal. There is no height or weight on file to calculate BMI. Pulsoximetry is normal per my interpretation.     Additional Vital Signs:  Vitals:    08/08/22 1253   BP: 117/79   Pulse: 80   Resp: 18   Temp: 97.9 °F (36.6 °C)   SpO2: 98%       Physical Exam  Constitutional:       Appearance: Normal appearance. Musculoskeletal:         General: No swelling or deformity. Normal range of motion. Right lower leg: No edema. Left lower leg: No edema. Comments: No bruising, no tenderness, full rom to L great toe    Skin:     General: Skin is warm and dry. Capillary Refill: Capillary refill takes less than 2 seconds. Neurological:      Mental Status: He is alert. Psychiatric:         Mood and Affect: Mood normal.         Behavior: Behavior normal.       ED RESULTS   Laboratory results:  Labs Reviewed - No data to display    Radiologic studies results:  XR FOOT LEFT (MIN 3 VIEWS)   Final Result   1. No acute bony abnormality. **This report has been created using voice recognition software. It may contain minor errors which are inherent in voice recognition technology. **      Final report electronically signed by Dr Jeison Rausch on 8/8/2022 1:48 PM          ED Medications administered this visit: Medications - No data to display      ED COURSE     ED Course as of 08/08/22 1410   Mon Aug 08, 2022   1355 XR FOOT LEFT (MIN 3 VIEWS)  1. No acute bony abnormality. [EL]      ED Course User Index  [EL] Nacho Stearns MD       MEDICAL DECISION MAKING   Initial Assessment:   19-year-old male chief complaint of toe pain. Given the patient's above chief complaint and findings on history and physical examination, I thought it was appropriate to consider the following emergency medical conditions:  Fracture, dislocation  Although some of these diagnoses are unlikely they were considered in my medical decision making. 19-year-old male chief complaint of pain to the left great toe after trauma. X-ray negative. Physical exam showing no point tenderness, no bruising, no abrasions. No obvious deformities. At this time, patient stable for discharge home. Advised her symptomatic treatment with Tylenol and Advil.   Patient agreeable to plan.    Strict return precautions and follow up instructions were discussed with the patient prior to discharge, with which the patient agrees. MEDICATION CHANGES     New Prescriptions    No medications on file         FINAL DISPOSITION     Final diagnoses: Toe injury, left, initial encounter     Condition: condition: stable  Dispo: Discharge to home      This transcription was electronically signed. Parts of this transcriptions may have been dictated by use of voice recognition software and electronically transcribed, and parts may have been transcribed with the assistance of an ED scribe. The transcription may contain errors not detected in proofreading. Please refer to my supervising physician's documentation if my documentation differs.     Electronically Signed: Sanju Alvarez MD, 08/08/22, 2:10 PM         Maricruz Multani MD  Resident  08/08/22 [id number]

## 2022-08-08 NOTE — DISCHARGE INSTRUCTIONS
You are seen today in the emergency department for toe pain. Your x-ray was negative. You may take Tylenol and Advil for pain control. Please read the following pamphlet about RICE protocol on how to manage the injury.

## 2022-08-08 NOTE — ED TRIAGE NOTES
Pt in through ED lobby. He states on Saturday he fell and injured his left great toe on a curb. He states since then he has had increased pain.

## 2022-09-02 ENCOUNTER — HOSPITAL ENCOUNTER (EMERGENCY)
Age: 22
Discharge: HOME | End: 2022-09-02
Payer: COMMERCIAL

## 2022-09-02 VITALS
BODY MASS INDEX: 18.68 KG/M2 | DIASTOLIC BLOOD PRESSURE: 76 MMHG | TEMPERATURE: 98.5 F | HEIGHT: 67 IN | OXYGEN SATURATION: 99 % | HEART RATE: 78 BPM | WEIGHT: 119 LBS | SYSTOLIC BLOOD PRESSURE: 115 MMHG | RESPIRATION RATE: 18 BRPM

## 2022-09-02 DIAGNOSIS — U07.1 COVID-19: Primary | ICD-10-CM

## 2022-09-02 LAB
FLU A ANTIGEN: NEGATIVE
FLU B ANTIGEN: NEGATIVE
SARS-COV-2, NAAT: DETECTED

## 2022-09-02 PROCEDURE — 87804 INFLUENZA ASSAY W/OPTIC: CPT

## 2022-09-02 PROCEDURE — 87635 SARS-COV-2 COVID-19 AMP PRB: CPT

## 2022-09-02 PROCEDURE — 99283 EMERGENCY DEPT VISIT LOW MDM: CPT

## 2022-09-02 PROCEDURE — 6370000000 HC RX 637 (ALT 250 FOR IP): Performed by: NURSE PRACTITIONER

## 2022-09-02 RX ORDER — IBUPROFEN 600 MG/1
600 TABLET ORAL 4 TIMES DAILY PRN
Qty: 60 TABLET | Refills: 0 | Status: SHIPPED | OUTPATIENT
Start: 2022-09-02

## 2022-09-02 RX ORDER — IBUPROFEN 800 MG/1
800 TABLET ORAL ONCE
Status: COMPLETED | OUTPATIENT
Start: 2022-09-02 | End: 2022-09-02

## 2022-09-02 RX ORDER — ONDANSETRON 4 MG/1
4 TABLET, ORALLY DISINTEGRATING ORAL EVERY 8 HOURS PRN
Qty: 20 TABLET | Refills: 0 | Status: SHIPPED | OUTPATIENT
Start: 2022-09-02

## 2022-09-02 RX ORDER — ONDANSETRON 4 MG/1
4 TABLET, ORALLY DISINTEGRATING ORAL ONCE
Status: COMPLETED | OUTPATIENT
Start: 2022-09-02 | End: 2022-09-02

## 2022-09-02 RX ADMIN — ONDANSETRON 4 MG: 4 TABLET, ORALLY DISINTEGRATING ORAL at 13:24

## 2022-09-02 RX ADMIN — IBUPROFEN 800 MG: 800 TABLET, FILM COATED ORAL at 13:24

## 2022-09-02 ASSESSMENT — ENCOUNTER SYMPTOMS
RHINORRHEA: 0
NAUSEA: 1
ABDOMINAL PAIN: 0
SHORTNESS OF BREATH: 0
VOMITING: 0
CONSTIPATION: 0
SINUS PRESSURE: 0
WHEEZING: 0
COUGH: 1
BLOOD IN STOOL: 0
EYE PAIN: 0
DIARRHEA: 0

## 2022-09-02 NOTE — Clinical Note
Scott Sanders was seen and treated in our emergency department on 9/2/2022. COVID19 virus is suspected. Per the CDC guidelines we recommend home isolation until the following conditions are all met:    1. At least five days have passed since symptoms first appeared and/or had a close exposure,   2. After home isolation for five days, wearing a mask around others for the next five days,  3. At least 24 have passed since last fever without the use of fever-reducing medications and  4. Symptoms (eg cough, shortness of breath) have improved    If you have any questions or concerns, please don't hesitate to call. He may return to work/school on 09/06/2022    Tested positive for COVID-19 on 9/2/2022.     Jayson Lema, VICTOR HUGO - CNP

## 2022-09-02 NOTE — Clinical Note
Aminah Cleveland was seen and treated in our emergency department on 9/2/2022. COVID19 virus is suspected. Per the CDC guidelines we recommend home isolation until the following conditions are all met:    1. At least five days have passed since symptoms first appeared and/or had a close exposure,   2. After home isolation for five days, wearing a mask around others for the next five days,  3. At least 24 have passed since last fever without the use of fever-reducing medications and  4. Symptoms (eg cough, shortness of breath) have improved    If you have any questions or concerns, please don't hesitate to call. He may return to work/school on 09/06/2022    Tested positive for COVID-19 on 9/2/2022.     Harleen Maciel, APRN - CNP

## 2022-09-02 NOTE — DISCHARGE INSTRUCTIONS
Return to ER for worsening symptoms, chest pain, inability to keep liquids down, inability to urinate for greater than 8 hours or difficulty breathing. Follow-up with your primary care provider. May take Tylenol or ibuprofen as needed for pain or fever.

## 2022-09-04 ENCOUNTER — HOSPITAL ENCOUNTER (EMERGENCY)
Age: 22
Discharge: HOME | End: 2022-09-04
Attending: EMERGENCY MEDICINE
Payer: COMMERCIAL

## 2022-09-04 ENCOUNTER — APPOINTMENT (OUTPATIENT)
Dept: GENERAL RADIOLOGY | Age: 22
End: 2022-09-04
Payer: COMMERCIAL

## 2022-09-04 VITALS
TEMPERATURE: 98.3 F | DIASTOLIC BLOOD PRESSURE: 93 MMHG | HEIGHT: 67 IN | WEIGHT: 130 LBS | OXYGEN SATURATION: 100 % | RESPIRATION RATE: 20 BRPM | BODY MASS INDEX: 20.4 KG/M2 | SYSTOLIC BLOOD PRESSURE: 126 MMHG | HEART RATE: 89 BPM

## 2022-09-04 DIAGNOSIS — R55 NEAR SYNCOPE: ICD-10-CM

## 2022-09-04 DIAGNOSIS — K29.00 ACUTE GASTRITIS WITHOUT HEMORRHAGE, UNSPECIFIED GASTRITIS TYPE: ICD-10-CM

## 2022-09-04 DIAGNOSIS — U07.1 COVID-19: Primary | ICD-10-CM

## 2022-09-04 LAB
ALBUMIN SERPL-MCNC: 4.6 G/DL (ref 3.5–5.1)
ALP BLD-CCNC: 59 U/L (ref 38–126)
ALT SERPL-CCNC: 12 U/L (ref 11–66)
ANION GAP SERPL CALCULATED.3IONS-SCNC: 13 MEQ/L (ref 8–16)
AST SERPL-CCNC: 25 U/L (ref 5–40)
BASOPHILS # BLD: 0.7 %
BASOPHILS ABSOLUTE: 0 THOU/MM3 (ref 0–0.1)
BILIRUB SERPL-MCNC: 0.3 MG/DL (ref 0.3–1.2)
BILIRUBIN DIRECT: < 0.2 MG/DL (ref 0–0.3)
BUN BLDV-MCNC: 9 MG/DL (ref 7–22)
CALCIUM SERPL-MCNC: 9.6 MG/DL (ref 8.5–10.5)
CHLORIDE BLD-SCNC: 97 MEQ/L (ref 98–111)
CO2: 28 MEQ/L (ref 23–33)
CREAT SERPL-MCNC: 0.8 MG/DL (ref 0.4–1.2)
EOSINOPHIL # BLD: 0.2 %
EOSINOPHILS ABSOLUTE: 0 THOU/MM3 (ref 0–0.4)
ERYTHROCYTE [DISTWIDTH] IN BLOOD BY AUTOMATED COUNT: 12.7 % (ref 11.5–14.5)
ERYTHROCYTE [DISTWIDTH] IN BLOOD BY AUTOMATED COUNT: 42 FL (ref 35–45)
GFR SERPL CREATININE-BSD FRML MDRD: > 90 ML/MIN/1.73M2
GLUCOSE BLD-MCNC: 119 MG/DL (ref 70–108)
HCT VFR BLD CALC: 43.6 % (ref 42–52)
HEMOGLOBIN: 14.9 GM/DL (ref 14–18)
IMMATURE GRANS (ABS): 0.01 THOU/MM3 (ref 0–0.07)
IMMATURE GRANULOCYTES: 0.2 %
LIPASE: 64.8 U/L (ref 5.6–51.3)
LYMPHOCYTES # BLD: 27.9 %
LYMPHOCYTES ABSOLUTE: 1.6 THOU/MM3 (ref 1–4.8)
MAGNESIUM: 2 MG/DL (ref 1.6–2.4)
MCH RBC QN AUTO: 31 PG (ref 26–33)
MCHC RBC AUTO-ENTMCNC: 34.2 GM/DL (ref 32.2–35.5)
MCV RBC AUTO: 90.6 FL (ref 80–94)
MONOCYTES # BLD: 9.5 %
MONOCYTES ABSOLUTE: 0.5 THOU/MM3 (ref 0.4–1.3)
NUCLEATED RED BLOOD CELLS: 0 /100 WBC
OSMOLALITY CALCULATION: 275.5 MOSMOL/KG (ref 275–300)
PLATELET # BLD: 213 THOU/MM3 (ref 130–400)
PMV BLD AUTO: 10.6 FL (ref 9.4–12.4)
POTASSIUM REFLEX MAGNESIUM: 3.2 MEQ/L (ref 3.5–5.2)
RBC # BLD: 4.81 MILL/MM3 (ref 4.7–6.1)
SEG NEUTROPHILS: 61.5 %
SEGMENTED NEUTROPHILS ABSOLUTE COUNT: 3.5 THOU/MM3 (ref 1.8–7.7)
SODIUM BLD-SCNC: 138 MEQ/L (ref 135–145)
TOTAL PROTEIN: 6.9 G/DL (ref 6.1–8)
WBC # BLD: 5.7 THOU/MM3 (ref 4.8–10.8)

## 2022-09-04 PROCEDURE — 6360000002 HC RX W HCPCS: Performed by: EMERGENCY MEDICINE

## 2022-09-04 PROCEDURE — 36415 COLL VENOUS BLD VENIPUNCTURE: CPT

## 2022-09-04 PROCEDURE — 93005 ELECTROCARDIOGRAM TRACING: CPT | Performed by: EMERGENCY MEDICINE

## 2022-09-04 PROCEDURE — 83690 ASSAY OF LIPASE: CPT

## 2022-09-04 PROCEDURE — 83735 ASSAY OF MAGNESIUM: CPT

## 2022-09-04 PROCEDURE — 99285 EMERGENCY DEPT VISIT HI MDM: CPT

## 2022-09-04 PROCEDURE — 80076 HEPATIC FUNCTION PANEL: CPT

## 2022-09-04 PROCEDURE — 80048 BASIC METABOLIC PNL TOTAL CA: CPT

## 2022-09-04 PROCEDURE — 71046 X-RAY EXAM CHEST 2 VIEWS: CPT

## 2022-09-04 PROCEDURE — 74019 RADEX ABDOMEN 2 VIEWS: CPT

## 2022-09-04 PROCEDURE — 6370000000 HC RX 637 (ALT 250 FOR IP): Performed by: EMERGENCY MEDICINE

## 2022-09-04 PROCEDURE — 96374 THER/PROPH/DIAG INJ IV PUSH: CPT

## 2022-09-04 PROCEDURE — 2580000003 HC RX 258: Performed by: EMERGENCY MEDICINE

## 2022-09-04 PROCEDURE — 85025 COMPLETE CBC W/AUTO DIFF WBC: CPT

## 2022-09-04 RX ORDER — ONDANSETRON 2 MG/ML
4 INJECTION INTRAMUSCULAR; INTRAVENOUS ONCE
Status: COMPLETED | OUTPATIENT
Start: 2022-09-04 | End: 2022-09-04

## 2022-09-04 RX ORDER — FAMOTIDINE 20 MG/1
20 TABLET, FILM COATED ORAL 2 TIMES DAILY
Qty: 20 TABLET | Refills: 0 | Status: SHIPPED | OUTPATIENT
Start: 2022-09-04 | End: 2022-09-14

## 2022-09-04 RX ORDER — 0.9 % SODIUM CHLORIDE 0.9 %
1000 INTRAVENOUS SOLUTION INTRAVENOUS ONCE
Status: COMPLETED | OUTPATIENT
Start: 2022-09-04 | End: 2022-09-04

## 2022-09-04 RX ADMIN — SODIUM CHLORIDE 1000 ML: 9 INJECTION, SOLUTION INTRAVENOUS at 13:30

## 2022-09-04 RX ADMIN — ONDANSETRON 4 MG: 2 INJECTION INTRAMUSCULAR; INTRAVENOUS at 13:29

## 2022-09-04 RX ADMIN — LIDOCAINE HYDROCHLORIDE: 20 SOLUTION ORAL; TOPICAL at 13:30

## 2022-09-04 ASSESSMENT — ENCOUNTER SYMPTOMS
VOMITING: 0
EYE REDNESS: 0
BACK PAIN: 0
ABDOMINAL PAIN: 1
CHEST TIGHTNESS: 0
COLOR CHANGE: 0
SINUS PRESSURE: 0
PHOTOPHOBIA: 0
COUGH: 0
NAUSEA: 1
BLOOD IN STOOL: 0
SORE THROAT: 0

## 2022-09-04 ASSESSMENT — PAIN - FUNCTIONAL ASSESSMENT: PAIN_FUNCTIONAL_ASSESSMENT: NONE - DENIES PAIN

## 2022-09-04 NOTE — ED NOTES
Patient resting in bed. Respirations easy and unlabored. No distress noted. Call light within reach.        Terry Payton RN  09/04/22 [id number]

## 2022-09-04 NOTE — ED TRIAGE NOTES
Presents to ED that tested positive for covid 2 days ago. Patient c/o nausea, body aches, and decreased appetite.

## 2022-09-04 NOTE — ED NOTES
Patient not in room at this time. Called patient to get IV removed. Patient did not answer. Called [place] police to notify [employer].       Antolin Chavez RN  09/04/22 [id number]

## 2022-09-04 NOTE — ED NOTES
Patient resting in bed. Respirations easy and unlabored. No distress noted. Call light within reach.        Terry Payton RN  09/04/22 2014

## 2022-09-04 NOTE — ED PROVIDER NOTES
[facility] ENCOUNTER          Pt Name: Ronny Huynh  MRN: [id number]  [place] 2000  Date of evaluation: 9/4/2022  Emergency Physician: Maria Guadalupe Richard, [address] [facility]       Chief Complaint   Patient presents with    Positive For Covid-19     History obtained from the patient. HISTORY OF PRESENT ILLNESS    HPI  Ronny Huynh is a 20 y.o. male who presents to the emergency department for evaluation of abdominal pain. Patient presents with a previously diagnosis of COVID-19 on 9/2/2022. Patient states he has been having epigastric pain and decreased p.o. intake over the last 24 hours. He states then today he was attempting to eat lunch. He states he developed sharp epigastric pain. He then felt lightheaded nauseous and tingly all over. This lasted for 5 to 10 minutes and then he was back to himself. Patient currently denies abdominal pain. He reports mild nausea. The patient has no other acute complaints at this time. REVIEW OF SYSTEMS   Review of Systems   Constitutional:  Negative for activity change, chills and fever. HENT:  Negative for congestion, sinus pressure and sore throat. Eyes:  Negative for photophobia, redness and visual disturbance. Respiratory:  Negative for cough and chest tightness. Cardiovascular:  Negative for chest pain, palpitations and leg swelling. Gastrointestinal:  Positive for abdominal pain and nausea. Negative for blood in stool and vomiting. Endocrine: Negative for polydipsia and polyuria. Genitourinary:  Negative for decreased urine volume, difficulty urinating, dysuria, flank pain, frequency and urgency. Musculoskeletal:  Negative for back pain, myalgias and neck pain. Skin:  Negative for color change and rash. Neurological:  Positive for syncope. Negative for weakness and headaches. Hematological:  Negative for adenopathy. Does not bruise/bleed easily. 130 lb (59 kg)         Additional Vital Signs:  Vitals:    09/04/22 1239   BP: (!) 126/93   Pulse: 89   Resp: 20   Temp: 98.3 °F (36.8 °C)   SpO2: 100%       Physical Exam  Vitals and nursing note reviewed. Constitutional:       General: He is not in acute distress. Appearance: He is well-developed. He is not diaphoretic. HENT:      Head: Normocephalic. Eyes:      Pupils: Pupils are equal, round, and reactive to light. Neck:      Vascular: No JVD. Cardiovascular:      Rate and Rhythm: Normal rate and regular rhythm. Heart sounds: Normal heart sounds. Pulmonary:      Effort: Pulmonary effort is normal.      Breath sounds: Normal breath sounds. Abdominal:      General: Bowel sounds are normal. There is no distension. Palpations: Abdomen is soft. Tenderness: There is abdominal tenderness (mild). There is no guarding or rebound. Musculoskeletal:         General: No tenderness or deformity. Normal range of motion. Cervical back: Normal range of motion and neck supple. Skin:     General: Skin is warm and dry. Capillary Refill: Capillary refill takes less than 2 seconds. Neurological:      Mental Status: He is alert and oriented to person, place, and time. Comments: Non-focal. Moves all extremities. Initial vital signs and nursing assessment reviewed and normal.   Pulsoximetry is normal per my interpretation. MEDICAL DECISION MAKING   Initial Assessment: Given the patient's above chief complaint and findings on history and physical examination, I thought it was appropriate to consider the following emergency medical conditions: Viral gastritis, syncope, dehydration, pancreatitis, hepatitis, cardiac syncope, electrolyte abnormality although some of these diagnoses are unlikely they were considered in my medical decision making.     Plan: CBC, BMP, ECG, lipase, LFTs, acute abdominal series x-ray, symptomatic treatment with hydration, antiemetic and reassess ED RESULTS   Laboratory results:  Labs Reviewed   LIPASE - Abnormal; Notable for the following components:       Result Value    Lipase 64.8 (*)     All other components within normal limits   BASIC METABOLIC PANEL W/ REFLEX TO MG FOR LOW K - Abnormal; Notable for the following components:    Potassium reflex Magnesium 3.2 (*)     Chloride 97 (*)     Glucose 119 (*)     All other components within normal limits   HEPATIC FUNCTION PANEL   CBC WITH AUTO DIFFERENTIAL   ANION GAP   GLOMERULAR FILTRATION RATE, ESTIMATED   OSMOLALITY   MAGNESIUM       Radiologic studies results:  XR ABDOMEN (2 VIEWS)   Final Result   Nonobstructive bowel gas pattern without evidence of free air. **This report has been created using voice recognition software. It may contain minor errors which are inherent in voice recognition technology. **      Final report electronically signed by Dr Gato Castro on 9/4/2022 1:44 PM      XR CHEST (2 VW)   Final Result   Normal chest series. **This report has been created using voice recognition software. It may contain minor errors which are inherent in voice recognition technology. **      Final report electronically signed by Dr. Daniel Duarte MD on 9/4/2022 1:16 PM          ED Medications administered this visit:   Medications   potassium bicarb-citric acid (EFFER-K) effervescent tablet 40 mEq (has no administration in time range)   0.9 % sodium chloride bolus (0 mLs IntraVENous Stopped 9/4/22 1430)   aluminum & magnesium hydroxide-simethicone (MAALOX) 30 mL, lidocaine viscous hcl (XYLOCAINE) 5 mL (GI COCKTAIL) ( Oral Given 9/4/22 1330)   ondansetron (ZOFRAN) injection 4 mg (4 mg IntraVENous Given 9/4/22 1329)         ED COURSE   Patient presents with symptoms suggestive of gastritis likely related to recent COVID diagnosis. Patient with nausea and epigastric discomfort while eating. There was associated near syncope.   EKG was performed and showed normal

## 2022-09-04 NOTE — DISCHARGE INSTRUCTIONS
Baby did have any new or changing symptoms such as abdominal pain, changes, heart racing or skipping beats, or you have any other concerns

## 2022-09-05 LAB
EKG ATRIAL RATE: 64 BPM
EKG P AXIS: 83 DEGREES
EKG P-R INTERVAL: 134 MS
EKG Q-T INTERVAL: 380 MS
EKG QRS DURATION: 86 MS
EKG QTC CALCULATION (BAZETT): 392 MS
EKG R AXIS: 86 DEGREES
EKG T AXIS: 81 DEGREES
EKG VENTRICULAR RATE: 64 BPM

## 2022-09-05 PROCEDURE — 93010 ELECTROCARDIOGRAM REPORT: CPT | Performed by: INTERNAL MEDICINE

## 2022-11-03 ENCOUNTER — HOSPITAL ENCOUNTER (EMERGENCY)
Age: 22
Discharge: HOME | End: 2022-11-03
Payer: COMMERCIAL

## 2022-11-03 VITALS
SYSTOLIC BLOOD PRESSURE: 112 MMHG | RESPIRATION RATE: 20 BRPM | TEMPERATURE: 98.2 F | HEART RATE: 61 BPM | OXYGEN SATURATION: 99 % | DIASTOLIC BLOOD PRESSURE: 73 MMHG

## 2022-11-03 DIAGNOSIS — J06.9 VIRAL URI WITH COUGH: Primary | ICD-10-CM

## 2022-11-03 LAB
GROUP A STREP CULTURE, REFLEX: NEGATIVE
INFLUENZA A: NOT DETECTED
INFLUENZA B: NOT DETECTED
REFLEX THROAT C + S: NORMAL
SARS-COV-2 RNA, RT PCR: NOT DETECTED

## 2022-11-03 PROCEDURE — 87880 STREP A ASSAY W/OPTIC: CPT

## 2022-11-03 PROCEDURE — 87070 CULTURE OTHR SPECIMN AEROBIC: CPT

## 2022-11-03 PROCEDURE — 99283 EMERGENCY DEPT VISIT LOW MDM: CPT

## 2022-11-03 PROCEDURE — 87636 SARSCOV2 & INF A&B AMP PRB: CPT

## 2022-11-03 RX ORDER — BENZONATATE 100 MG/1
100 CAPSULE ORAL 3 TIMES DAILY PRN
Qty: 21 CAPSULE | Refills: 0 | Status: SHIPPED | OUTPATIENT
Start: 2022-11-03 | End: 2022-11-10

## 2022-11-03 NOTE — DISCHARGE INSTRUCTIONS
Recommend cough medication, rest, oral hydration. Follow up with PCP in the next 3 days if not improving.

## 2022-11-03 NOTE — ED NOTES
Patient to ED for cough and sore throat x3 days.  Patient concerned for Shreya Means, RN  11/03/22 1031

## 2022-11-05 LAB — THROAT/NOSE CULTURE: NORMAL

## 2022-11-13 ASSESSMENT — ENCOUNTER SYMPTOMS
COUGH: 1
VOMITING: 0
EYES NEGATIVE: 1
DIARRHEA: 1
SHORTNESS OF BREATH: 0
ABDOMINAL PAIN: 0
NAUSEA: 1
SORE THROAT: 0

## 2022-11-13 NOTE — ED PROVIDER NOTES
[facility]Y DEPT    EMERGENCY MEDICINE     Pt Name: Angela Lynn  MRN: [id number]  [place] 2000  Date of evaluation: 11/3/2022  Provider: Sumit Loya PA-C    CHIEF COMPLAINT       Chief Complaint   Patient presents with    Cough    Pharyngitis       HISTORY OF PRESENT ILLNESS    Angela Lynn is a pleasant 20 y.o. male who presents to the emergency department for cough, pharyngitis. Patient states for the past 2 days he has had cough, sore throat, fatigue. He is also noted congestion, chills, diarrhea, nausea. No complaints of vomiting, chest pain, shortness of breath, abdominal pain, back pain, fevers. No sick contacts. Able to eat and drink adequately. No other concerns or complaints at this time. Triage notes and Nursing notes were reviewed by myself. Any discrepancies are addressed above. PAST MEDICAL HISTORY     Past Medical History:   Diagnosis Date    ADHD (attention deficit hyperactivity disorder)        SURGICAL HISTORY     History reviewed. No pertinent surgical history.     CURRENT MEDICATIONS       Discharge Medication List as of 11/3/2022  3:36 PM        CONTINUE these medications which have NOT CHANGED    Details   famotidine (PEPCID) 20 MG tablet Take 1 tablet by mouth 2 times daily for 10 days, Disp-20 tablet, R-0Normal      ondansetron (ZOFRAN ODT) 4 MG disintegrating tablet Place 1 tablet under the tongue every 8 hours as needed for Nausea, Disp-20 tablet, R-0Normal      ibuprofen (ADVIL;MOTRIN) 600 MG tablet Take 1 tablet by mouth 4 times daily as needed for Pain, Disp-60 tablet, R-0Normal      sertraline (ZOLOFT) 50 MG tablet Take 1 tablet by mouth daily, Disp-30 tablet, R-0Normal      traZODone (DESYREL) 50 MG tablet Take 0.5 tablets by mouth nightly as needed for Sleep, Disp-30 tablet, R-0Normal      albuterol sulfate  (90 Base) MCG/ACT inhaler Inhale 2 puffs into the lungs every 4 hours as needed for Wheezing, Disp-1 Inhaler, R-0Print ALLERGIES     Patient has no known allergies. FAMILY HISTORY     History reviewed. No pertinent family history. SOCIAL HISTORY       Social History     Socioeconomic History    Marital status: Single     Spouse name: None    Number of children: None    Years of education: None    Highest education level: None   Tobacco Use    Smoking status: Every Day     Packs/day: 0.50     Years: 0.00     Pack years: 0.00     Types: Cigarettes    Smokeless tobacco: Never    Tobacco comments:     patient does not want to quit   Vaping Use    Vaping Use: Never used   Substance and Sexual Activity    Alcohol use: Not Currently     Comment: rare    Drug use: Yes     Types: Marijuana Davis Goldberg)    Sexual activity: Not Currently       REVIEW OF SYSTEMS     Review of Systems   Constitutional:  Positive for chills. Negative for fever. HENT:  Positive for congestion. Negative for ear pain and sore throat. Eyes: Negative. Respiratory:  Positive for cough. Negative for shortness of breath. Cardiovascular:  Negative for chest pain and palpitations. Gastrointestinal:  Positive for diarrhea and nausea. Negative for abdominal pain and vomiting. Endocrine: Negative. Genitourinary:  Negative for dysuria and frequency. Musculoskeletal:  Negative for myalgias and neck pain. Skin:  Negative for rash. Neurological:  Negative for dizziness, light-headedness and headaches. Hematological: Negative. Psychiatric/Behavioral: Negative. All other systems reviewed and are negative. Except as noted above the remainder of the review of systems was reviewed and is. SCREENINGS                          PHYSICAL EXAM     INITIAL VITALS:  oral temperature is 98.2 °F (36.8 °C). His blood pressure is 112/73 and his pulse is 61. His respiration is 20 and oxygen saturation is 99%. Physical Exam  Vitals and nursing note reviewed. Exam conducted with a chaperone present.    Constitutional:       General: He is not in acute distress. Appearance: Normal appearance. He is normal weight. He is not ill-appearing, toxic-appearing or diaphoretic. HENT:      Head: Normocephalic and atraumatic. Right Ear: Tympanic membrane, ear canal and external ear normal. There is no impacted cerumen. Left Ear: Tympanic membrane, ear canal and external ear normal. There is no impacted cerumen. Nose: Congestion and rhinorrhea present. Mouth/Throat:      Mouth: Mucous membranes are moist.      Pharynx: Oropharynx is clear. Posterior oropharyngeal erythema present. No oropharyngeal exudate. Eyes:      Extraocular Movements: Extraocular movements intact. Conjunctiva/sclera: Conjunctivae normal.      Pupils: Pupils are equal, round, and reactive to light. Cardiovascular:      Rate and Rhythm: Normal rate and regular rhythm. Pulses: Normal pulses. Heart sounds: Normal heart sounds. No murmur heard. Pulmonary:      Effort: Pulmonary effort is normal. No respiratory distress. Breath sounds: Normal breath sounds. No stridor. No wheezing, rhonchi or rales. Chest:      Chest wall: No tenderness. Abdominal:      General: Bowel sounds are normal. There is no distension. Palpations: Abdomen is soft. Tenderness: There is no abdominal tenderness. There is no right CVA tenderness, left CVA tenderness, guarding or rebound. Musculoskeletal:         General: Normal range of motion. Cervical back: Normal range of motion and neck supple. Skin:     General: Skin is warm and dry. Capillary Refill: Capillary refill takes less than 2 seconds. Neurological:      Mental Status: He is alert and oriented to person, place, and time. GCS: GCS eye subscore is 4. GCS verbal subscore is 5. GCS motor subscore is 6. Psychiatric:         Mood and Affect: Mood normal.         Behavior: Behavior normal.         Thought Content:  Thought content normal.         Judgment: Judgment normal.       DIFFERENTIAL DIAGNOSIS:   Differential diagnoses are discussed    DIAGNOSTIC RESULTS     EKG:(none if blank)  All EKGs are interpreted by the Emergency Department Physician who either signs or Co-signs this chart in the absence of a cardiologist.          RADIOLOGY: (none if blank)   I directly visualized the following images and reviewed the radiologist interpretations. Interpretation per the Radiologist below, if available at the time of this note:  No orders to display       LABS:   Labs Reviewed   COVID-19 & INFLUENZA COMBO   CULTURE, THROAT    Narrative:     Source: throat       Site:           Current Antibiotics: none   GROUP A STREP, REFLEX       All other labs were within normal range or not returned as of this dictation. Please note, any cultures that may have been sent were not resulted at the time of this patient visit. EMERGENCY DEPARTMENT COURSE:   Vitals:    Vitals:    11/03/22 1346   BP: 112/73   Pulse: 61   Resp: 20   Temp: 98.2 °F (36.8 °C)   TempSrc: Oral   SpO2: 99%     4:53 AM EST: The patient was seen and evaluated. PROCEDURES: (None if blank)  Procedures         ED Medications administered this visit:  Medications - No data to display    MDM:  Patient is 76-year-old male who came to the ED to be evaluated for cough, sore throat. Appropriate testing/imaging of rapid COVID, influenza, strep was done based on the patient's initial complaints, history, and physical exam.   Pertinent results were none. Discussed findings with patient. With negative swabs likely viral in nature and recommend symptomatic treatment. Will prescribe Tessalon Perles for coughing. Recommend NSAIDs/Tylenol for pain and fevers. Recommend plenty of clear fluid oral hydration and rest.  Follow-up with PCP in the next 3 days if not improving. Patient was seen independently by myself. The patient's final impression and disposition and plan was determined by myself.       Strict return precautions and follow up instructions were discussed with the patient prior to discharge, with which the patient agrees. Physical assessment findings, diagnostic testing(s) if applicable, and vital signs reviewed with patient/patient representative. Questions answered. Medications as directed, including OTC medications for supportive care. Education provided on medications. Differential diagnosis(s) discussed with patient/patient representative. Home care/self care instructions reviewed with patient/patient representative. Patient is to follow-up with family care provider in 3 days if no improvement. Patient is to go to the emergency department if symptoms worsen. Patient/patient representative is aware of care plan, questions answered, verbalizes understanding and is in agreement. CRITICAL CARE:   None    CONSULTS:  None    PROCEDURES:  None    FINAL IMPRESSION      1.  Viral URI with cough          DISPOSITION/PLAN   Discharge home    PATIENT REFERRED TO:  PCP    In 3 days  If not improving    [facility] EMERGENCY DEPT  [address] [place] [address]  [phone]  In 2 days  If symptoms worsen      DISCHARGEMEDICATIONS:  Discharge Medication List as of 11/3/2022  3:36 PM        START taking these medications    Details   benzonatate (TESSALON PERLES) 100 MG capsule Take 1 capsule by mouth 3 times daily as needed for Cough, Disp-21 capsule, R-0Normal                  (Please note that portions of this note were completed with a voice recognition program.  Efforts were made to edit the dictations but occasionallywords are mis-transcribed.)      Catherine Nielsen PA-C(electronically signed)  Physician Associate, Emergency Department        Catherine Nielsen PA-C  11/13/22 6316

## 2023-05-18 ENCOUNTER — HOSPITAL ENCOUNTER (EMERGENCY)
Age: 23
Discharge: HOME | End: 2023-05-18
Payer: COMMERCIAL

## 2023-05-18 VITALS
DIASTOLIC BLOOD PRESSURE: 63 MMHG | SYSTOLIC BLOOD PRESSURE: 112 MMHG | OXYGEN SATURATION: 100 % | RESPIRATION RATE: 20 BRPM | HEART RATE: 65 BPM | TEMPERATURE: 98.2 F

## 2023-05-18 DIAGNOSIS — Z20.2 EXPOSURE TO TRICHOMONAS: Primary | ICD-10-CM

## 2023-05-18 PROCEDURE — 99213 OFFICE O/P EST LOW 20 MIN: CPT

## 2023-05-18 PROCEDURE — 6370000000 HC RX 637 (ALT 250 FOR IP): Performed by: NURSE PRACTITIONER

## 2023-05-18 PROCEDURE — 99202 OFFICE O/P NEW SF 15 MIN: CPT | Performed by: NURSE PRACTITIONER

## 2023-05-18 RX ORDER — METRONIDAZOLE 500 MG/1
2000 TABLET ORAL ONCE
Status: COMPLETED | OUTPATIENT
Start: 2023-05-18 | End: 2023-05-18

## 2023-05-18 RX ADMIN — METRONIDAZOLE 2000 MG: 500 TABLET ORAL at 18:07

## 2023-05-18 ASSESSMENT — ENCOUNTER SYMPTOMS
SHORTNESS OF BREATH: 0
VOMITING: 0
NAUSEA: 0

## 2023-05-18 NOTE — ED TRIAGE NOTES
Pt to UC for std treatment. Pt reports girlfriend started treatment today for trich. Pt denies any symptoms.

## 2023-05-18 NOTE — ED PROVIDER NOTES
[facility]  Urgent Care Encounter       CHIEF COMPLAINT       Chief Complaint   Patient presents with    Exposure to STD     Trich        Nurses Notes reviewed and I agree except as noted in the HPI. HISTORY OF PRESENT ILLNESS   Raiza Toledo is a 24 y.o. male who presents for evaluation after exposure to trichomonas. Patient states that his nnamdi is currently pregnant and had a visit with her OB/GYN where she found out she was positive for trichomonas. States that his fizherae does not have any symptoms and he has been asymptomatic as well. He denies any other sexual partners over the past year. He states that he would like to be treated for this. The history is provided by the patient. REVIEW OF SYSTEMS     Review of Systems   Constitutional:  Negative for chills and fever. Respiratory:  Negative for shortness of breath. Cardiovascular:  Negative for chest pain. Gastrointestinal:  Negative for nausea and vomiting. Genitourinary:  Negative for dysuria, penile discharge, scrotal swelling and testicular pain. Musculoskeletal:  Negative for arthralgias and myalgias. Skin:  Negative for rash. Neurological:  Negative for headaches. PAST MEDICAL HISTORY         Diagnosis Date    ADHD (attention deficit hyperactivity disorder)        SURGICALHISTORY     Patient  has no past surgical history on file.     CURRENT MEDICATIONS       Previous Medications    ALBUTEROL SULFATE  (90 BASE) MCG/ACT INHALER    Inhale 2 puffs into the lungs every 4 hours as needed for Wheezing    FAMOTIDINE (PEPCID) 20 MG TABLET    Take 1 tablet by mouth 2 times daily for 10 days    IBUPROFEN (ADVIL;MOTRIN) 600 MG TABLET    Take 1 tablet by mouth 4 times daily as needed for Pain    ONDANSETRON (ZOFRAN ODT) 4 MG DISINTEGRATING TABLET    Place 1 tablet under the tongue every 8 hours as needed for Nausea    SERTRALINE (ZOLOFT) 50 MG TABLET    Take 1 tablet by mouth daily    TRAZODONE

## 2023-09-14 ENCOUNTER — APPOINTMENT (OUTPATIENT)
Dept: GENERAL RADIOLOGY | Age: 23
End: 2023-09-14
Payer: COMMERCIAL

## 2023-09-14 ENCOUNTER — HOSPITAL ENCOUNTER (EMERGENCY)
Age: 23
Discharge: HOME | End: 2023-09-14
Payer: COMMERCIAL

## 2023-09-14 VITALS
WEIGHT: 135 LBS | BODY MASS INDEX: 21.19 KG/M2 | DIASTOLIC BLOOD PRESSURE: 87 MMHG | HEIGHT: 67 IN | OXYGEN SATURATION: 99 % | SYSTOLIC BLOOD PRESSURE: 123 MMHG | HEART RATE: 80 BPM | TEMPERATURE: 98.1 F | RESPIRATION RATE: 18 BRPM

## 2023-09-14 DIAGNOSIS — J40 BRONCHITIS: Primary | ICD-10-CM

## 2023-09-14 DIAGNOSIS — J06.9 VIRAL UPPER RESPIRATORY TRACT INFECTION: ICD-10-CM

## 2023-09-14 LAB
FLUAV RNA RESP QL NAA+PROBE: NOT DETECTED
FLUBV RNA RESP QL NAA+PROBE: NOT DETECTED
S PYO AG THROAT QL: NEGATIVE
S PYO THROAT QL CULT: NORMAL
SARS-COV-2 RNA RESP QL NAA+PROBE: NOT DETECTED

## 2023-09-14 PROCEDURE — 71046 X-RAY EXAM CHEST 2 VIEWS: CPT

## 2023-09-14 PROCEDURE — 87636 SARSCOV2 & INF A&B AMP PRB: CPT

## 2023-09-14 PROCEDURE — 87070 CULTURE OTHR SPECIMN AEROBIC: CPT

## 2023-09-14 PROCEDURE — 99284 EMERGENCY DEPT VISIT MOD MDM: CPT

## 2023-09-14 PROCEDURE — 87880 STREP A ASSAY W/OPTIC: CPT

## 2023-09-14 RX ORDER — FLUTICASONE PROPIONATE 50 MCG
2 SPRAY, SUSPENSION (ML) NASAL DAILY
Qty: 16 G | Refills: 0 | Status: SHIPPED | OUTPATIENT
Start: 2023-09-14

## 2023-09-14 RX ORDER — METHYLPREDNISOLONE 4 MG/1
TABLET ORAL
Qty: 21 TABLET | Refills: 0 | Status: SHIPPED | OUTPATIENT
Start: 2023-09-14 | End: 2023-09-20

## 2023-09-14 RX ORDER — BENZONATATE 100 MG/1
100 CAPSULE ORAL 3 TIMES DAILY PRN
Qty: 30 CAPSULE | Refills: 0 | Status: SHIPPED | OUTPATIENT
Start: 2023-09-14 | End: 2023-09-24

## 2023-09-14 ASSESSMENT — PAIN DESCRIPTION - LOCATION: LOCATION: THROAT

## 2023-09-14 ASSESSMENT — PAIN SCALES - GENERAL: PAINLEVEL_OUTOF10: 4

## 2023-09-14 ASSESSMENT — PAIN - FUNCTIONAL ASSESSMENT: PAIN_FUNCTIONAL_ASSESSMENT: 0-10

## 2023-09-14 NOTE — ED TRIAGE NOTES
Pt presents to the ED from home with complaints of having a sore throat and cough for the past three days. States he just has not been feeling well and that his son was recently diagnosed with strep.

## 2023-09-14 NOTE — DISCHARGE INSTRUCTIONS
Use Tylenol and/or ibuprofen as needed for body aches, headaches, chills, fever, and generalized malaise.

## 2023-09-16 LAB — BACTERIA THROAT AEROBE CULT: NORMAL

## 2024-01-18 ENCOUNTER — HOSPITAL ENCOUNTER (EMERGENCY)
Age: 24
Discharge: HOME | End: 2024-01-18
Attending: STUDENT IN AN ORGANIZED HEALTH CARE EDUCATION/TRAINING PROGRAM
Payer: COMMERCIAL

## 2024-01-18 VITALS
BODY MASS INDEX: 21.19 KG/M2 | DIASTOLIC BLOOD PRESSURE: 88 MMHG | OXYGEN SATURATION: 95 % | TEMPERATURE: 98.4 F | HEART RATE: 91 BPM | SYSTOLIC BLOOD PRESSURE: 128 MMHG | WEIGHT: 135 LBS | HEIGHT: 67 IN | RESPIRATION RATE: 18 BRPM

## 2024-01-18 DIAGNOSIS — J10.1 INFLUENZA B: Primary | ICD-10-CM

## 2024-01-18 LAB
FLUAV RNA RESP QL NAA+PROBE: NOT DETECTED
FLUBV RNA RESP QL NAA+PROBE: DETECTED
SARS-COV-2 RNA RESP QL NAA+PROBE: NOT DETECTED

## 2024-01-18 PROCEDURE — 87636 SARSCOV2 & INF A&B AMP PRB: CPT

## 2024-01-18 PROCEDURE — 99283 EMERGENCY DEPT VISIT LOW MDM: CPT

## 2024-01-18 RX ORDER — ALBUTEROL SULFATE 90 UG/1
2 AEROSOL, METERED RESPIRATORY (INHALATION) 4 TIMES DAILY PRN
Qty: 18 G | Refills: 0 | Status: SHIPPED | OUTPATIENT
Start: 2024-01-18

## 2024-01-18 RX ORDER — ONDANSETRON 4 MG/1
4 TABLET, FILM COATED ORAL EVERY 8 HOURS PRN
Qty: 6 TABLET | Refills: 0 | Status: SHIPPED | OUTPATIENT
Start: 2024-01-18 | End: 2024-01-24

## 2024-01-18 ASSESSMENT — PAIN DESCRIPTION - LOCATION: LOCATION: GENERALIZED

## 2024-01-18 ASSESSMENT — PAIN SCALES - GENERAL: PAINLEVEL_OUTOF10: 5

## 2024-01-18 ASSESSMENT — PAIN - FUNCTIONAL ASSESSMENT: PAIN_FUNCTIONAL_ASSESSMENT: 0-10

## 2024-01-18 NOTE — DISCHARGE INSTRUCTIONS
You are positive for influenza B today.  Please make an appointment with the internal medicine clinic for reevaluation in 3 days    Take your medication as indicated and prescribed.  For pain use acetaminophen (Tylenol) or ibuprofen (Motrin / Advil), unless prescribed medications that have acetaminophen or ibuprofen (or similar medications) in it.  You can take over the counter acetaminophen tablets (1 - 2 tablets of the 500-mg strength every 6 hours) or ibuprofen tablets (2 tablets every 4 hours).    You can use over the counter allergy medication (Allegra, Claritan, Zyrtec, etc) to help with the symptoms.  Drink plenty of water.  Avoid drinking alcohol or drinks that have caffeine.       You can use Zofran as needed for nausea and to drink clear liquids to stay hydrated.  Use the albuterol as needed for shortness of breath or wheezing.    Placing a humidifier in your room at night may be beneficial for helping with nasal congestion.    PLEASE RETURN TO THE EMERGENCY DEPARTMENT IMMEDIATELY for worsening symptoms, persistent fever, nausea and/or vomiting, or if you develop any concerning symptoms such as: high fever not relieved by acetaminophen (Tylenol) and/or ibuprofen (Motrin / Advil), chills, shortness of breath, chest pain, feeling of your heart fluttering or racing, loss of consciousness, numbness, weakness or tingling in the arms or legs or change in color of the extremities, changes in mental status, persistent headache, blurry vision, loss of bladder / bowel control, unable to follow up with your physician, or other any other care or concern.

## 2024-01-18 NOTE — DISCHARGE INSTR - COC
Continuity of Care Form    Patient Name: Sami Arenas   :  2000  MRN:  [id number]    Admit date:  2024  Discharge date:  ***    Code Status Order: Prior   Advance Directives:     Admitting Physician:  No admitting provider for patient encounter.  PCP: No primary care provider on file.    Discharging Nurse: ***  Discharging Hospital Unit/Room#: B/B  Discharging Unit Phone Number: ***    Emergency Contact:   Extended Emergency Contact Information  Primary Emergency Contact: Kavita Valdivia  Address: [address] [place] [id number] [facility]  Home Phone: [phone]  Relation: Parent  Secondary Emergency Contact: Peggy Arenas   [facility]  Home Phone: [phone]  Relation: Grandparent    Past Surgical History:  History reviewed. No pertinent surgical history.    Immunization History:     There is no immunization history on file for this patient.    Active Problems:  Patient Active Problem List   Diagnosis Code    Suicide attempt ([facility]) T14.91XA    Depression with suicidal ideation F32.A, R45.851       Isolation/Infection:   Isolation            No Isolation          Patient Infection Status       Infection Onset Added Last Indicated Last Indicated By Review Planned Expiration Resolved Resolved By    Influenza 24 COVID-19 & Influenza Combo 24      Resolved    COVID-19 22 COVID-19, Rapid   22 Infection                        Nurse Assessment:  Last Vital Signs: /88   Pulse 91   Temp 98.4 °F (36.9 °C) (Oral)   Resp 18   Ht 1.702 m (5' 7\")   Wt 61.2 kg (135 lb)   SpO2 95%   BMI 21.14 kg/m²     Last documented pain score (0-10 scale): Pain Level: 5  Last Weight:   Wt Readings from Last 1 Encounters:   24 61.2 kg (135 lb)     Mental Status:  {IP PT MENTAL STATUS:}    IV Access:  { ELIAZAR IV ACCESS:[id number]}    Nursing Mobility/ADLs:  Walking   {[facility]

## 2024-01-18 NOTE — ED PROVIDER NOTES
[facility]  EMERGENCY DEPARTMENT ENCOUNTER      Patient Name: Sami Arenas  MRN: [id number]  YOB: 2000  Date of Evaluation: 1/18/2024  Emergency Physician: Donal Moralez MD    CHIEF COMPLAINT       Chief Complaint   Patient presents with    Fever    Emesis    Generalized Body Aches       HISTORY OF PRESENT ILLNESS    HPI    History obtained from the patient.    Sami Arenas is a 23 y.o. male with PMHx of ADHD who presents to the emergency department from home by private vehicle for evaluation of flulike symptoms.  Patient reports 5 days of myalgias, fevers Tmax 102 Fahrenheit, sore throat, rhinorrhea, congestion, nausea and a few episodes of emesis over the last 2 days.  Also reports diarrhea but no belly pain or chest pain.  Reports feeling slightly winded when walking around or climbing stairs and especially carrying his new baby.  No rashes or neck rigidity or stiffness    Pertinent previous and/or external records reviewed: Non-contributory    REVIEW OF SYSTEMS   Review of Systems  Negative unless documented in HPI    PAST MEDICAL AND SURGICAL HISTORY     Past Medical History:   Diagnosis Date    ADHD (attention deficit hyperactivity disorder)        History reviewed. No pertinent surgical history.    CURRENT MEDICATIONS     Previous Medications    ALBUTEROL SULFATE  (90 BASE) MCG/ACT INHALER    Inhale 2 puffs into the lungs every 4 hours as needed for Wheezing    FAMOTIDINE (PEPCID) 20 MG TABLET    Take 1 tablet by mouth 2 times daily for 10 days    FLUTICASONE (FLONASE) 50 MCG/ACT NASAL SPRAY    2 sprays by Each Nostril route daily    IBUPROFEN (ADVIL;MOTRIN) 600 MG TABLET    Take 1 tablet by mouth 4 times daily as needed for Pain    ONDANSETRON (ZOFRAN ODT) 4 MG DISINTEGRATING TABLET    Place 1 tablet under the tongue every 8 hours as needed for Nausea    SERTRALINE (ZOLOFT) 50 MG TABLET    Take 1 tablet by mouth daily    TRAZODONE (DESYREL)

## 2024-01-18 NOTE — ED TRIAGE NOTES
Pt presents to the ED from home with complaints of not feeling well. States he has been running a fever, having body aches and hasn't been able to keep much down. VSS.

## 2024-02-22 ENCOUNTER — HOSPITAL ENCOUNTER (EMERGENCY)
Age: 24
Discharge: HOME | End: 2024-02-22
Payer: COMMERCIAL

## 2024-02-22 VITALS
HEART RATE: 82 BPM | OXYGEN SATURATION: 98 % | WEIGHT: 135 LBS | BODY MASS INDEX: 21.14 KG/M2 | TEMPERATURE: 98.7 F | SYSTOLIC BLOOD PRESSURE: 126 MMHG | RESPIRATION RATE: 18 BRPM | DIASTOLIC BLOOD PRESSURE: 91 MMHG

## 2024-02-22 DIAGNOSIS — S61.209A AVULSION OF FINGER, INITIAL ENCOUNTER: Primary | ICD-10-CM

## 2024-02-22 PROCEDURE — 6360000002 HC RX W HCPCS: Performed by: NURSE PRACTITIONER

## 2024-02-22 PROCEDURE — 90715 TDAP VACCINE 7 YRS/> IM: CPT | Performed by: NURSE PRACTITIONER

## 2024-02-22 PROCEDURE — 99284 EMERGENCY DEPT VISIT MOD MDM: CPT

## 2024-02-22 PROCEDURE — 90471 IMMUNIZATION ADMIN: CPT | Performed by: NURSE PRACTITIONER

## 2024-02-22 RX ADMIN — TETANUS TOXOID, REDUCED DIPHTHERIA TOXOID AND ACELLULAR PERTUSSIS VACCINE, ADSORBED 0.5 ML: 5; 2.5; 8; 8; 2.5 SUSPENSION INTRAMUSCULAR at 04:06

## 2024-02-22 ASSESSMENT — PAIN - FUNCTIONAL ASSESSMENT: PAIN_FUNCTIONAL_ASSESSMENT: 0-10

## 2024-02-22 ASSESSMENT — PAIN DESCRIPTION - DESCRIPTORS: DESCRIPTORS: SHARP

## 2024-02-22 ASSESSMENT — PAIN SCALES - GENERAL: PAINLEVEL_OUTOF10: 5

## 2024-02-22 NOTE — ED NOTES
Pt arrives to ED with laceration to left middle finger. Rates pain 5/10. States he can't get it to stop bleeding and has a band on it now and will need cleaned. Respirations easy and unlabored. Denies any needs.

## 2024-02-22 NOTE — DISCHARGE INSTRUCTIONS
Leave dressing in place x 12-16 hours.  You can remove it after that.  Wash area twice a day and apply antibiotic ointment.

## 2024-02-28 NOTE — ED PROVIDER NOTES
were not resulted at the time of this patient visit)    MEDICAL DECISION MAKING / ED COURSE:     Summary of Patient Presentation:      Plan:wound care    1) Number and Complexity of Problems                    Differential Diagnosis includes (but not limited to):  laceration                   Pertinent Comorbid Conditions:    Reviewed per the chart    2)  Data Reviewed (none if left blank, additional information can be found in the ED course)          My Independent interpretations:     EKG:           Imaging:      Labs:                          Decision Rules/Clinical Scores utilized:                        Previous visit summary and patient history available on EMR and was reviewed.     History obtained from chart review and the patient.     Pertinent previous records reviewed:  previous notes, admissions and hospitalizations .    Code Status: Not addressed at time of initial evaluation             See Formal Diagnostic Results above for the lab and radiology tests and orders.         3)  Treatment and Disposition         ED Reassessment/Response to interventions:        NA         Case discussed with consulting clinician/attending physician:  None/None         Shared Decision-Making was performed and disposition discussed with the       Patient/Family and questions answered          Social determinants of health impacting treatment or disposition:     4) MIPS  N/A                    Medical Decision Making  The patient is seen in the ER for a laceration to the left middle finger.  Wound is an avulsion.  No repair needed.  Wound is dressed with quick clot and dressing.  Patient is advised on wound care and discussed follow up and return precautions.  DC Home with close follow up      Vitals Reviewed:    Vitals:    02/22/24 0218   BP: (!) 126/91   Pulse: 82   Resp: 18   Temp: 98.7 °F (37.1 °C)   TempSrc: Oral   SpO2: 98%   Weight: 61.2 kg (135 lb)       The patient was seen and examined. Appropriate diagnostic

## 2024-09-22 NOTE — PROGRESS NOTES
This RN has reviewed and agrees with Jackeline Oates LPN's data collection and has collaborated with this LPN regarding the patient's care plan. Please follow-up with your primary doctor, rheumatologist or your chiropractor.  Please see attached for copy of results.  Findings of constipation indicate that you should make sure you are taking your MiraLAX and your senna.  You may also take Colace.  Have plenty of fiber in your diet.  Drink plenty of water.  Please continue taking the muscle relaxant cyclobenzaprine, affix a lidocaine patch every 8 hours as needed, and we have prescribed Percocet 1 tablet every 6 hours as needed for severe pain.  Follow-up with your doctors.  Return to the ER if there is any worsening or concerns.  If there is development of any new symptoms or complaints, please return.

## 2025-02-16 ENCOUNTER — HOSPITAL ENCOUNTER (EMERGENCY)
Age: 25
Discharge: HOME | End: 2025-02-16
Attending: STUDENT IN AN ORGANIZED HEALTH CARE EDUCATION/TRAINING PROGRAM

## 2025-02-16 VITALS
RESPIRATION RATE: 19 BRPM | OXYGEN SATURATION: 98 % | HEART RATE: 61 BPM | DIASTOLIC BLOOD PRESSURE: 71 MMHG | SYSTOLIC BLOOD PRESSURE: 122 MMHG | TEMPERATURE: 97.9 F

## 2025-02-16 DIAGNOSIS — K04.7 APICAL ABSCESS: Primary | ICD-10-CM

## 2025-02-16 LAB
ANION GAP SERPL CALC-SCNC: 10 MEQ/L (ref 8–16)
BASOPHILS ABSOLUTE: 0 THOU/MM3 (ref 0–0.1)
BASOPHILS NFR BLD AUTO: 0.4 %
BUN SERPL-MCNC: 11 MG/DL (ref 7–22)
CALCIUM SERPL-MCNC: 9 MG/DL (ref 8.5–10.5)
CHLORIDE SERPL-SCNC: 103 MEQ/L (ref 98–111)
CO2 SERPL-SCNC: 28 MEQ/L (ref 23–33)
CREAT SERPL-MCNC: 0.7 MG/DL (ref 0.4–1.2)
DEPRECATED RDW RBC AUTO: 46.3 FL (ref 35–45)
EOSINOPHIL NFR BLD AUTO: 1.6 %
EOSINOPHILS ABSOLUTE: 0.2 THOU/MM3 (ref 0–0.4)
ERYTHROCYTE [DISTWIDTH] IN BLOOD BY AUTOMATED COUNT: 13.6 % (ref 11.5–14.5)
GFR SERPL CREATININE-BSD FRML MDRD: > 90 ML/MIN/1.73M2
GLUCOSE SERPL-MCNC: 97 MG/DL (ref 70–108)
HCT VFR BLD AUTO: 39.8 % (ref 42–52)
HGB BLD-MCNC: 12.9 GM/DL (ref 14–18)
IMM GRANULOCYTES # BLD AUTO: 0.03 THOU/MM3 (ref 0–0.07)
IMM GRANULOCYTES NFR BLD AUTO: 0.3 %
LYMPHOCYTES ABSOLUTE: 1.5 THOU/MM3 (ref 1–4.8)
LYMPHOCYTES NFR BLD AUTO: 15.6 %
MCH RBC QN AUTO: 30 PG (ref 26–33)
MCHC RBC AUTO-ENTMCNC: 32.4 GM/DL (ref 32.2–35.5)
MCV RBC AUTO: 92.6 FL (ref 80–94)
MONOCYTES ABSOLUTE: 1.1 THOU/MM3 (ref 0.4–1.3)
MONOCYTES NFR BLD AUTO: 11.6 %
NEUTROPHILS ABSOLUTE: 6.8 THOU/MM3 (ref 1.8–7.7)
NEUTROPHILS NFR BLD AUTO: 70.5 %
NRBC BLD AUTO-RTO: 0 /100 WBC
OSMOLALITY SERPL CALC.SUM OF ELEC: 280.6 MOSMOL/KG (ref 275–300)
PLATELET # BLD AUTO: 239 THOU/MM3 (ref 130–400)
PMV BLD AUTO: 10.2 FL (ref 9.4–12.4)
POTASSIUM SERPL-SCNC: 4.1 MEQ/L (ref 3.5–5.2)
RBC # BLD AUTO: 4.3 MILL/MM3 (ref 4.7–6.1)
SODIUM SERPL-SCNC: 141 MEQ/L (ref 135–145)
WBC # BLD AUTO: 9.6 THOU/MM3 (ref 4.8–10.8)

## 2025-02-16 PROCEDURE — 6370000000 HC RX 637 (ALT 250 FOR IP)

## 2025-02-16 PROCEDURE — 36415 COLL VENOUS BLD VENIPUNCTURE: CPT

## 2025-02-16 PROCEDURE — 99283 EMERGENCY DEPT VISIT LOW MDM: CPT

## 2025-02-16 PROCEDURE — 85025 COMPLETE CBC W/AUTO DIFF WBC: CPT

## 2025-02-16 PROCEDURE — 80048 BASIC METABOLIC PNL TOTAL CA: CPT

## 2025-02-16 RX ORDER — HYDROCODONE BITARTRATE AND ACETAMINOPHEN 10; 325 MG/1; MG/1
1 TABLET ORAL ONCE
Status: COMPLETED | OUTPATIENT
Start: 2025-02-16 | End: 2025-02-16

## 2025-02-16 RX ORDER — CLINDAMYCIN HYDROCHLORIDE 150 MG/1
450 CAPSULE ORAL 3 TIMES DAILY
Qty: 90 CAPSULE | Refills: 0 | Status: SHIPPED | OUTPATIENT
Start: 2025-02-16 | End: 2025-02-26

## 2025-02-16 RX ORDER — CLINDAMYCIN HYDROCHLORIDE 150 MG/1
450 CAPSULE ORAL ONCE
Status: COMPLETED | OUTPATIENT
Start: 2025-02-16 | End: 2025-02-16

## 2025-02-16 RX ORDER — CLINDAMYCIN PHOSPHATE 600 MG/50ML
600 INJECTION, SOLUTION INTRAVENOUS ONCE
Status: DISCONTINUED | OUTPATIENT
Start: 2025-02-16 | End: 2025-02-16

## 2025-02-16 RX ADMIN — CLINDAMYCIN HYDROCHLORIDE 450 MG: 150 CAPSULE ORAL at 11:10

## 2025-02-16 RX ADMIN — HYDROCODONE BITARTRATE AND ACETAMINOPHEN 1 TABLET: 10; 325 TABLET ORAL at 11:10

## 2025-02-16 NOTE — DISCHARGE INSTRUCTIONS
Your evaluated in the ED today for a dental infection.  You will be started on antibiotics for this.  Please complete the full course of antibiotics, even if you begin to feel better.  Antibiotics were sent to your [facility] pharmacy, and you were given your first dose here in the ED.  Please  your prescription today, February 16, 2025 and begin taking.  In addition, you were given follow-up instructions for the [facility] dental.  This is a walk-in dental clinic.  Please call tomorrow to see if they are open given the holiday, in addition to calling to your normal dentist to see if they can fit you in on Monday or Tuesday of next week.  If not, follow-up with the [facility] dental in the walk-in clinic, again if they are open.  Please come back to the ED if you develop difficulty breathing or swallowing, or have difficulty managing her pain at home.

## 2025-02-16 NOTE — ED PROVIDER NOTES
[facility] EMERGENCY DEPARTMENT  EMERGENCY DEPARTMENT ENCOUNTER          Pt Name: Sami Arenas  MRN: [id number]  Birthdate 2000  Date of evaluation: 2/16/2025  Physician: Mati Vega MD  Supervising Attending Physician: Rachna att. providers found       CHIEF COMPLAINT       Chief Complaint   Patient presents with    Oral Swelling         HISTORY OF PRESENT ILLNESS    HPI  Sami Arenas is a 24 y.o. male who presents to the emergency department from home, by private vehicle for evaluation of dental pain.  The left side of his lower jaw began hurting on Friday, but he has been unable to make an appointment the dentist.  The patient reports that he noted left-sided facial swelling and has been requiring ibuprofen to eat due to pain in the jaw with movement.  The patient states that yesterday he felt an \"explosion of pus\" that he swallowed, but did not see.  The patient reports that he last took ibuprofen at 830 today, and currently rates his pain a 6 out of 10.  The patient states that he will call the dentist in the morning to try and make appointment.  His significant other, who is in the room with him was question regarding the patient's voice, asking whether or not it was his normal voice.  His significant states that his voice sounds more muffled than typical.  The patient denies any respiratory difficulty, and does not endorse any fever/chills, states that he is always cold due to him think he has anemia.  The patient reports that he had his left side with some teeth taken out approximately 6 months ago but has not been on any antibiotics or remotely are currently.  The patient has no other acute complaints at this time.      REVIEW OF SYSTEMS   Review of Systems      PAST MEDICAL AND SURGICAL HISTORY     Past Medical History:   Diagnosis Date    ADHD (attention deficit hyperactivity disorder)      No past surgical history on file.      MEDICATIONS   No current